# Patient Record
Sex: FEMALE | Race: BLACK OR AFRICAN AMERICAN | Employment: UNEMPLOYED | RURAL
[De-identification: names, ages, dates, MRNs, and addresses within clinical notes are randomized per-mention and may not be internally consistent; named-entity substitution may affect disease eponyms.]

---

## 2017-09-11 ENCOUNTER — OFFICE VISIT (OUTPATIENT)
Dept: INTERNAL MEDICINE CLINIC | Age: 42
End: 2017-09-11

## 2017-09-11 VITALS
BODY MASS INDEX: 45.08 KG/M2 | HEART RATE: 66 BPM | OXYGEN SATURATION: 100 % | TEMPERATURE: 96.1 F | WEIGHT: 245 LBS | SYSTOLIC BLOOD PRESSURE: 184 MMHG | RESPIRATION RATE: 20 BRPM | DIASTOLIC BLOOD PRESSURE: 96 MMHG | HEIGHT: 62 IN

## 2017-09-11 DIAGNOSIS — H57.89 IRRITATION OF RIGHT EYE: ICD-10-CM

## 2017-09-11 DIAGNOSIS — I10 ESSENTIAL HYPERTENSION: Primary | ICD-10-CM

## 2017-09-11 RX ORDER — AMLODIPINE BESYLATE 10 MG/1
10 TABLET ORAL DAILY
Qty: 90 TAB | Refills: 1 | Status: SHIPPED | OUTPATIENT
Start: 2017-09-11 | End: 2018-01-24 | Stop reason: SDUPTHER

## 2017-09-11 RX ORDER — HYDROCHLOROTHIAZIDE 12.5 MG/1
12.5 TABLET ORAL DAILY
Qty: 30 TAB | Refills: 5 | Status: SHIPPED | OUTPATIENT
Start: 2017-09-11 | End: 2017-12-15 | Stop reason: SDUPTHER

## 2017-09-11 NOTE — PROGRESS NOTES
HISTORY OF PRESENT ILLNESS  John Urbina is a 42 y.o. female. HPI  Chief Complaint   Patient presents with    Eye Problem     something on eyeball - light sensitive    Hypertension     States has been out of BP medication. States taking only metoprolol for BP. Denies chest pain or SOB    States last week eye became irritated. States feels like trash is in it. Denies trauma  States has been using visine that has been effective    Review of Systems   Constitutional: Negative. Negative for chills, fever and malaise/fatigue. HENT: Negative. Negative for congestion and sore throat. Eyes: Positive for discharge and redness. States feels like trash is in right eye. Respiratory: Negative. Negative for cough and shortness of breath. Cardiovascular: Negative. Negative for chest pain and leg swelling. Gastrointestinal: Negative. Negative for abdominal pain, blood in stool, constipation, diarrhea, nausea and vomiting. Genitourinary: Negative. Musculoskeletal: Negative for joint pain and myalgias. Skin: Negative. Neurological: Negative. Physical Exam   Constitutional: She is oriented to person, place, and time. She appears well-developed and well-nourished. No distress. HENT:   Head: Normocephalic and atraumatic. Eyes: Conjunctivae are normal.   Neck: Normal range of motion. Neck supple. Cardiovascular: Normal rate, regular rhythm, normal heart sounds and intact distal pulses. Exam reveals no gallop and no friction rub. No murmur heard. Pulmonary/Chest: Effort normal and breath sounds normal. No respiratory distress. She has no wheezes. She has no rales. Musculoskeletal: Normal range of motion. She exhibits tenderness. Low back pain   Neurological: She is alert and oriented to person, place, and time. Skin: Skin is warm and dry. She is not diaphoretic. Nursing note and vitals reviewed.     Plan of care and AVS reviewed with patient who verbalized understanding. ASSESSMENT and PLAN    ICD-10-CM ICD-9-CM    1. Essential hypertension I10 401.9 amLODIPine (NORVASC) 10 mg tablet      hydroCHLOROthiazide (HYDRODIURIL) 12.5 mg tablet   2. Irritation of right eye H57.8 379.99 REFERRAL TO OPHTHALMOLOGY      peg 400-propylene glycol, PF, (SYSTANE, PF,) 0.4-0.3 % dpet ophthalmic solution   Encouraged to make appointment tomorrow with eye doctor. Encouraged to use systane prn. Restarted BP medications.

## 2017-09-11 NOTE — PROGRESS NOTES
Out of refills on all meds - eye irrtated - red eyes - light sensitive - never had a mammogram   Aimee Aaron LPN  5/73/7304  7:28 PM

## 2017-09-11 NOTE — MR AVS SNAPSHOT
Visit Information Date & Time Provider Department Dept. Phone Encounter #  
 9/11/2017  2:45 PM Cynthia Werner, 1 [facility] [phone] Follow-up Instructions Return in about 1 week (around 9/18/2017) for HTN. Upcoming Health Maintenance Date Due Pneumococcal 19-64 Medium Risk (1 of 1 - PPSV23) 12/17/1993 DTaP/Tdap/Td series (1 - Tdap) 12/17/1995 BREAST CANCER SCRN MAMMOGRAM 12/17/2014 INFLUENZA AGE 9 TO ADULT 8/1/2017 PAP AKA CERVICAL CYTOLOGY 8/11/2018 Allergies as of 9/11/2017  Review Complete On: 9/11/2017 By: Cynthia Werner NP Severity Noted Reaction Type Reactions Ancef [Cefazolin]  01/30/2013    Hives Pcn [Penicillins]  01/30/2013    Swelling Current Immunizations  Never Reviewed No immunizations on file. Not reviewed this visit You Were Diagnosed With   
  
 Codes Comments Essential hypertension    -  Primary ICD-10-CM: I10 
ICD-9-CM: 401.9 Irritation of right eye     ICD-10-CM: H57.8 ICD-9-CM: 379.99 Vitals BP Pulse Temp Resp Height(growth percentile) Weight(growth percentile) (!) 184/96 (BP 1 Location: Right arm, BP Patient Position: At rest) 66 96.1 °F (35.6 °C) (Oral) 20 5' 2\" (1.575 m) 245 lb (111.1 kg) SpO2 BMI OB Status Smoking Status 100% 44.81 kg/m2 Hysterectomy Current Every Day Smoker Vitals History BMI and BSA Data Body Mass Index Body Surface Area 44.81 kg/m 2 2.2 m 2 Preferred Pharmacy Pharmacy Name Phone [facility] [phone] [address] [phone] Your Updated Medication List  
  
   
This list is accurate as of: 9/11/17  4:43 PM.  Always use your most recent med list. amLODIPine 10 mg tablet Commonly known as:  Herve Cordial Take 1 Tab by mouth daily. atorvastatin 40 mg tablet Commonly known as:  LIPITOR Take 1 Tab by mouth daily. DULoxetine 20 mg capsule Commonly known as:  CYMBALTA Take 1 Cap by mouth daily. hydroCHLOROthiazide 12.5 mg tablet Commonly known as:  HYDRODIURIL Take 1 Tab by mouth daily. Indications: hypertension  
  
 loxapine 10 mg capsule Commonly known as:  Sol Fallen Take 10 mg by mouth daily. menthol 4 % Gel Commonly known as:  BIOFREEZE (Dunning) 5 mL by Apply Externally route three (3) times daily. methylPREDNISolone 4 mg tablet Commonly known as:  Meally Sinks Take as directed  
  
 metoprolol tartrate 25 mg tablet Commonly known as:  LOPRESSOR Take 1 Tab by mouth two (2) times a day. Indications: hold for HR less than 55 or SBP less than 90  
  
 omeprazole 20 mg capsule Commonly known as:  PRILOSEC Take 1 Cap by mouth daily. SYSTANE (PF) 0.4-0.3 % Dpet ophthalmic solution Generic drug:  peg 400-propylene glycol (PF) Administer 2 Drops to both eyes as needed for Ocular Dryness. Prescriptions Sent to Pharmacy Refills  
 amLODIPine (NORVASC) 10 mg tablet 1 Sig: Take 1 Tab by mouth daily. Class: Normal  
 Pharmacy: [facility] [id number] [place] [address] Ph #: [phone] Route: Oral  
 hydroCHLOROthiazide (HYDRODIURIL) 12.5 mg tablet 5 Sig: Take 1 Tab by mouth daily. Indications: hypertension Class: Normal  
 Pharmacy: [facility] [id number] [address] Ph #: [phone] Route: Oral  
  
We Performed the Following REFERRAL TO OPHTHALMOLOGY [REF57 Custom] Comments:  
 Please evaluate and treat  patient for right eye irritation Follow-up Instructions Return in about 1 week (around 9/18/2017) for HTN. Referral Information Referral ID Referred By Referred To  
  
 [id number] Prabhu, [address] [facility], [facility]  Phone: [phone] Fax: [phone] Visits Status Start Date End Date 1 New Request 9/11/17 9/11/18 If your referral has a status of pending review or denied, additional information will be sent to support the outcome of this decision. Introducing [facility] [place] [facility]! [facility] introduces [facility] patient portal. Now you can access parts of your medical record, email your doctor's office, and request medication refills online. 1. In your internet browser, go to [email or web address]. [email or web address] 2. Click on the First Time User? Click Here link in the Sign In box. You will see the New Member Sign Up page. 3. Enter your [facility] Access Code exactly as it appears below. You will not need to use this code after youve completed the sign-up process. If you do not sign up before the expiration date, you must request a new code. · [facility] Access Code: [email or web address] Expires: 12/10/2017  3:09 PM 
 
4. Enter the last four digits of your Social Security Number (xxxx) and Date of Birth (mm/dd/yyyy) as indicated and click Submit. You will be taken to the next sign-up page. 5. Create a [facility] ID. This will be your [facility] login ID and cannot be changed, so think of one that is secure and easy to remember. 6. Create a [facility] password. You can change your password at any time. 7. Enter your Password Reset Question and Answer. This can be used at a later time if you forget your password. 8. Enter your e-mail address. You will receive e-mail notification when new information is available in [address] 9. Click Sign Up. You can now view and download portions of your medical record. 10. Click the Download Summary menu link to download a portable copy of your medical information. If you have questions, please visit the Frequently Asked Questions section of the [facility] website. Remember, [facility] is NOT to be used for urgent needs. For medical emergencies, dial 911. Now available from your iPhone and Android! Please provide this summary of care documentation to your next provider. Your primary care clinician is listed as Arville Numbers. If you have any questions after today's visit, please call [phone].

## 2017-12-15 ENCOUNTER — OFFICE VISIT (OUTPATIENT)
Dept: INTERNAL MEDICINE CLINIC | Age: 42
End: 2017-12-15

## 2017-12-15 VITALS
WEIGHT: 241.6 LBS | RESPIRATION RATE: 20 BRPM | DIASTOLIC BLOOD PRESSURE: 90 MMHG | BODY MASS INDEX: 44.46 KG/M2 | TEMPERATURE: 97.9 F | HEIGHT: 62 IN | SYSTOLIC BLOOD PRESSURE: 164 MMHG | HEART RATE: 72 BPM | OXYGEN SATURATION: 98 %

## 2017-12-15 DIAGNOSIS — E78.00 HIGH CHOLESTEROL: ICD-10-CM

## 2017-12-15 DIAGNOSIS — M54.42 CHRONIC MIDLINE LOW BACK PAIN WITH BILATERAL SCIATICA: Primary | ICD-10-CM

## 2017-12-15 DIAGNOSIS — M62.838 MUSCLE SPASM: ICD-10-CM

## 2017-12-15 DIAGNOSIS — G89.29 CHRONIC MIDLINE LOW BACK PAIN WITH BILATERAL SCIATICA: Primary | ICD-10-CM

## 2017-12-15 DIAGNOSIS — I10 ESSENTIAL HYPERTENSION: ICD-10-CM

## 2017-12-15 DIAGNOSIS — M54.41 CHRONIC MIDLINE LOW BACK PAIN WITH BILATERAL SCIATICA: Primary | ICD-10-CM

## 2017-12-15 DIAGNOSIS — R73.03 PREDIABETES: ICD-10-CM

## 2017-12-15 RX ORDER — CYCLOBENZAPRINE HCL 10 MG
10 TABLET ORAL
Qty: 45 TAB | Refills: 1 | Status: SHIPPED | OUTPATIENT
Start: 2017-12-15 | End: 2018-01-07 | Stop reason: SDUPTHER

## 2017-12-15 RX ORDER — IBUPROFEN 800 MG/1
800 TABLET ORAL
Qty: 60 TAB | Refills: 2 | Status: SHIPPED | OUTPATIENT
Start: 2017-12-15 | End: 2018-01-24 | Stop reason: SDUPTHER

## 2017-12-15 RX ORDER — KETOROLAC TROMETHAMINE 30 MG/ML
30 INJECTION, SOLUTION INTRAMUSCULAR; INTRAVENOUS ONCE
Qty: 1 VIAL | Refills: 0 | Status: SHIPPED | COMMUNITY
Start: 2017-12-15 | End: 2017-12-15

## 2017-12-15 RX ORDER — HYDROCHLOROTHIAZIDE 25 MG/1
25 TABLET ORAL DAILY
Qty: 30 TAB | Refills: 5 | COMMUNITY
Start: 2017-12-15 | End: 2018-01-24 | Stop reason: SDUPTHER

## 2017-12-15 NOTE — PROGRESS NOTES
Chief Complaint   Patient presents with    Back Pain     PAIN WENT DOWN TO BILATERAL PINKY TOES TO POINT OF FEET FEELING NUMB. SHE STATES THAT THE MUSCLES IN HER BACK IS GETTING TIGHTER AND NOT GETTING LOOSE. 1. Have you been to the ER, urgent care clinic since your last visit? Hospitalized since your last visit? No    2. Have you seen or consulted any other health care providers outside of the [address] [place] [address] since your last visit? Include any pap smears or colon screening. No     There are no preventive care reminders to display for this patient.

## 2017-12-15 NOTE — PROGRESS NOTES
HISTORY OF PRESENT ILLNESS  Daniel Walker is a 42 y.o. female. HPI  Chief Complaint   Patient presents with    Back Pain     PAIN WENT DOWN TO BILATERAL PINKY TOES TO POINT OF FEET FEELING NUMB. SHE STATES THAT THE MUSCLES IN HER BACK IS GETTING TIGHTER AND NOT GETTING LOOSE. Patient in with C/O chronic back pain. Patient has been seen before for this condition and has not followed thru with appointments with pain management and orthro. Discussed with patient will not give narcotics for pain related to her admission of previous misuse of narcotic medication given to her. Patient C/O low back pain rating 8/10. States is having muscle spasms also. States pain is keeping her from taking trips. States walking long periods of time causes leg pain. Review of Systems   Constitutional: Negative for chills, fever and malaise/fatigue. HENT: Negative for congestion, ear pain and sinus pain. Eyes: Negative. Respiratory: Negative. Negative for cough and shortness of breath. Cardiovascular: Negative. Negative for chest pain and leg swelling. Gastrointestinal: Negative. Negative for abdominal pain, constipation, diarrhea and vomiting. Genitourinary: Negative. Skin: Negative. Physical Exam   Constitutional: She is oriented to person, place, and time. She appears well-developed and well-nourished. No distress. HENT:   Head: Normocephalic and atraumatic. Eyes: Conjunctivae are normal.   Neck: Normal range of motion. Neck supple. Cardiovascular: Normal rate, regular rhythm, normal heart sounds and intact distal pulses. Exam reveals no gallop and no friction rub. No murmur heard. Pulmonary/Chest: Effort normal and breath sounds normal.   Abdominal: Soft. Bowel sounds are normal. She exhibits no distension. There is no tenderness. Musculoskeletal: Normal range of motion. She exhibits tenderness. She exhibits no edema or deformity.    Neurological: She is alert and oriented to person, place, and time. Skin: Skin is warm and dry. No rash noted. She is not diaphoretic. No erythema. Nursing note and vitals reviewed. Plan of care and AVS reviewed with patient who verbalized understanding. ASSESSMENT and PLAN    ICD-10-CM ICD-9-CM    1. Chronic midline low back pain with bilateral sciatica M54.41 724.2 ibuprofen (MOTRIN) 800 mg tablet    M54.42 724.3 cyclobenzaprine (FLEXERIL) 10 mg tablet    G89.29 338.29 ketorolac tromethamine (TORADOL) 60 mg/2 mL soln      KETOROLAC TROMETHAMINE INJ      DE THER/PROPH/DIAG INJECTION, SUBCUT/IM   2. Essential hypertension I10 401.9 hydroCHLOROthiazide (HYDRODIURIL) 25 mg tablet      METABOLIC PANEL, COMPREHENSIVE      CANCELED: METABOLIC PANEL, COMPREHENSIVE   3. Muscle spasm M62.838 728.85 cyclobenzaprine (FLEXERIL) 10 mg tablet   4. Prediabetes R73.03 790.29 HEMOGLOBIN A1C WITH EAG      CANCELED: HEMOGLOBIN A1C WITH EAG   5. High cholesterol E78.00 272.0 LIPID PANEL      CANCELED: LIPID PANEL   Patient to call Ortho to schedule appointment. Discussed with patient use of motrin and flexeril  Advised flexeril to cause sedation and not to drive vehicle while taking.   Patient to come in for labs 1 week before appointment/physical exam.

## 2017-12-15 NOTE — PATIENT INSTRUCTIONS
Body Mass Index: Care Instructions  Your Care Instructions    Body mass index (BMI) can help you see if your weight is raising your risk for health problems. It uses a formula to compare how much you weigh with how tall you are. · A BMI lower than 18.5 is considered underweight. · A BMI between 18.5 and 24.9 is considered healthy. · A BMI between 25 and 29.9 is considered overweight. A BMI of 30 or higher is considered obese. If your BMI is in the normal range, it means that you have a lower risk for weight-related health problems. If your BMI is in the overweight or obese range, you may be at increased risk for weight-related health problems, such as high blood pressure, heart disease, stroke, arthritis or joint pain, and diabetes. If your BMI is in the underweight range, you may be at increased risk for health problems such as fatigue, lower protection (immunity) against illness, muscle loss, bone loss, hair loss, and hormone problems. BMI is just one measure of your risk for weight-related health problems. You may be at higher risk for health problems if you are not active, you eat an unhealthy diet, or you drink too much alcohol or use tobacco products. Follow-up care is a key part of your treatment and safety. Be sure to make and go to all appointments, and call your doctor if you are having problems. It's also a good idea to know your test results and keep a list of the medicines you take. How can you care for yourself at home? · Practice healthy eating habits. This includes eating plenty of fruits, vegetables, whole grains, lean protein, and low-fat dairy. · If your doctor recommends it, get more exercise. Walking is a good choice. Bit by bit, increase the amount you walk every day. Try for at least 30 minutes on most days of the week. · Do not smoke. Smoking can increase your risk for health problems. If you need help quitting, talk to your doctor about stop-smoking programs and medicines. These can increase your chances of quitting for good. · Limit alcohol to 2 drinks a day for men and 1 drink a day for women. Too much alcohol can cause health problems. If you have a BMI higher than 25  · Your doctor may do other tests to check your risk for weight-related health problems. This may include measuring the distance around your waist. A waist measurement of more than 40 inches in men or 35 inches in women can increase the risk of weight-related health problems. · Talk with your doctor about steps you can take to stay healthy or improve your health. You may need to make lifestyle changes to lose weight and stay healthy, such as changing your diet and getting regular exercise. If you have a BMI lower than 18.5  · Your doctor may do other tests to check your risk for health problems. · Talk with your doctor about steps you can take to stay healthy or improve your health. You may need to make lifestyle changes to gain or maintain weight and stay healthy, such as getting more healthy foods in your diet and doing exercises to build muscle. Where can you learn more? Go to http://polly-hasmukh.info/. Enter S176 in the search box to learn more about \"Body Mass Index: Care Instructions. \"  Current as of: October 13, 2016  Content Version: 11.4  © 3268-3157 [employer]. Care instructions adapted under license by [employer] (which disclaims liability or warranty for this information). If you have questions about a medical condition or this instruction, always ask your healthcare professional. [address] any warranty or liability for your use of this information.

## 2017-12-15 NOTE — MR AVS SNAPSHOT
Visit Information Date & Time Provider Department Dept. Phone Encounter #  
 12/15/2017  2:00 PM Gauri Ornelas, 1 [facility] [phone] Follow-up Instructions Return in about 1 month (around 1/15/2018) for physical exam. Upcoming Health Maintenance Date Due Pneumococcal 19-64 Medium Risk (1 of 1 - PPSV23) 12/30/2017* BREAST CANCER SCRN MAMMOGRAM 12/30/2017* PAP AKA CERVICAL CYTOLOGY 8/11/2018 DTaP/Tdap/Td series (2 - Td) 9/11/2027 *Topic was postponed. The date shown is not the original due date. Allergies as of 12/15/2017  Review Complete On: 12/15/2017 By: Gauri Ornelas NP Severity Noted Reaction Type Reactions Ancef [Cefazolin]  01/30/2013    Hives Pcn [Penicillins]  01/30/2013    Swelling Current Immunizations  Never Reviewed No immunizations on file. Not reviewed this visit You Were Diagnosed With   
  
 Codes Comments Chronic midline low back pain with bilateral sciatica    -  Primary ICD-10-CM: M54.41, M54.42, G89.29 ICD-9-CM: 724.2, 724.3, 338.29 Essential hypertension     ICD-10-CM: I10 
ICD-9-CM: 401.9 Muscle spasm     ICD-10-CM: L58.339 ICD-9-CM: 728.85 Prediabetes     ICD-10-CM: R73.03 
ICD-9-CM: 790.29 High cholesterol     ICD-10-CM: E78.00 ICD-9-CM: 272.0 Vitals BP Pulse Temp Resp Height(growth percentile) (!) 164/94 (BP 1 Location: Left arm, BP Patient Position: Sitting) 72 97.9 °F (36.6 °C) (Temporal) 20 5' 2\" (1.575 m) Weight(growth percentile) SpO2 BMI OB Status Smoking Status 241 lb 9.6 oz (109.6 kg) 98% 44.19 kg/m2 Hysterectomy Current Every Day Smoker Vitals History BMI and BSA Data Body Mass Index Body Surface Area  
 44.19 kg/m 2 2.19 m 2 Preferred Pharmacy Pharmacy Name Phone [phone] [facility] 20 [phone] Your Updated Medication List  
  
   
 This list is accurate as of: 12/15/17  2:59 PM.  Always use your most recent med list. amLODIPine 10 mg tablet Commonly known as:  Stuart Alstrom Take 1 Tab by mouth daily. atorvastatin 40 mg tablet Commonly known as:  LIPITOR Take 1 Tab by mouth daily. cyclobenzaprine 10 mg tablet Commonly known as:  FLEXERIL Take 1 Tab by mouth three (3) times daily as needed for Muscle Spasm(s). DULoxetine 20 mg capsule Commonly known as:  CYMBALTA Take 1 Cap by mouth daily. hydroCHLOROthiazide 25 mg tablet Commonly known as:  HYDRODIURIL Take 1 Tab by mouth daily. Indications: hypertension  
  
 ibuprofen 800 mg tablet Commonly known as:  MOTRIN Take 1 Tab by mouth every eight (8) hours as needed for Pain.  
  
 ketorolac tromethamine 60 mg/2 mL Soln Commonly known as:  TORADOL  
1 mL by IntraMUSCular route once for 1 dose. metoprolol tartrate 25 mg tablet Commonly known as:  LOPRESSOR Take 1 Tab by mouth two (2) times a day. Indications: hold for HR less than 55 or SBP less than 90 Prescriptions Sent to Pharmacy Refills  
 ibuprofen (MOTRIN) 800 mg tablet 2 Sig: Take 1 Tab by mouth every eight (8) hours as needed for Pain. Class: Normal  
 Pharmacy: [facility] [id number] [place] [address] Ph #: [phone] Route: Oral  
 cyclobenzaprine (FLEXERIL) 10 mg tablet 1 Sig: Take 1 Tab by mouth three (3) times daily as needed for Muscle Spasm(s). Class: Normal  
 Pharmacy: [facility] [id number] [place] [address] Ph #: [phone] Route: Oral  
  
We Performed the Following KETOROLAC TROMETHAMINE INJ [ [facility]] [place] THER/PROPH/DIAG INJECTION, SUBCUT/IM [id number] CPT(R)] Follow-up Instructions Return in about 1 month (around 1/15/2018) for physical exam. To-Do List   
 12/15/2017 Lab:  HEMOGLOBIN A1C WITH EAG   
  
 12/15/2017   Lab:  LIPID PANEL   
  
 12/15/2017 Lab:  METABOLIC PANEL, COMPREHENSIVE Patient Instructions Body Mass Index: Care Instructions Your Care Instructions Body mass index (BMI) can help you see if your weight is raising your risk for health problems. It uses a formula to compare how much you weigh with how tall you are. · A BMI lower than 18.5 is considered underweight. · A BMI between 18.5 and 24.9 is considered healthy. · A BMI between 25 and 29.9 is considered overweight. A BMI of 30 or higher is considered obese. If your BMI is in the normal range, it means that you have a lower risk for weight-related health problems. If your BMI is in the overweight or obese range, you may be at increased risk for weight-related health problems, such as high blood pressure, heart disease, stroke, arthritis or joint pain, and diabetes. If your BMI is in the underweight range, you may be at increased risk for health problems such as fatigue, lower protection (immunity) against illness, muscle loss, bone loss, hair loss, and hormone problems. BMI is just one measure of your risk for weight-related health problems. You may be at higher risk for health problems if you are not active, you eat an unhealthy diet, or you drink too much alcohol or use tobacco products. Follow-up care is a key part of your treatment and safety. Be sure to make and go to all appointments, and call your doctor if you are having problems. It's also a good idea to know your test results and keep a list of the medicines you take. How can you care for yourself at home? · Practice healthy eating habits. This includes eating plenty of fruits, vegetables, whole grains, lean protein, and low-fat dairy. · If your doctor recommends it, get more exercise. Walking is a good choice. Bit by bit, increase the amount you walk every day. Try for at least 30 minutes on most days of the week. · Do not smoke. Smoking can increase your risk for health problems. If you need help quitting, talk to your doctor about stop-smoking programs and medicines. These can increase your chances of quitting for good. · Limit alcohol to 2 drinks a day for men and 1 drink a day for women. Too much alcohol can cause health problems. If you have a BMI higher than 25 · Your doctor may do other tests to check your risk for weight-related health problems. This may include measuring the distance around your waist. A waist measurement of more than 40 inches in men or 35 inches in women can increase the risk of weight-related health problems. · Talk with your doctor about steps you can take to stay healthy or improve your health. You may need to make lifestyle changes to lose weight and stay healthy, such as changing your diet and getting regular exercise. If you have a BMI lower than 18.5 · Your doctor may do other tests to check your risk for health problems. · Talk with your doctor about steps you can take to stay healthy or improve your health. You may need to make lifestyle changes to gain or maintain weight and stay healthy, such as getting more healthy foods in your diet and doing exercises to build muscle. Where can you learn more? Go to http://polly-hasmukh.info/. Enter S176 in the search box to learn more about \"Body Mass Index: Care Instructions. \" Current as of: October 13, 2016 Content Version: 11.4 © 4429-6333 Healthwise, Incorporated. Care instructions adapted under license by [employer] (which disclaims liability or warranty for this information). If you have questions about a medical condition or this instruction, always ask your healthcare professional. [place] [address] any warranty or liability for your use of this information. Introducing [facility] & HEALTH SERVICES!    
 [facility] introduces MyMedMatch patient portal. Now you can access parts of your medical record, email your doctor's office, and request medication refills online. 1. In your internet browser, go to [email or web address] 2. Click on the First Time User? Click Here link in the Sign In box. You will see the New Member Sign Up page. 3. Enter your Teach 'n Go Access Code exactly as it appears below. You will not need to use this code after youve completed the sign-up process. If you do not sign up before the expiration date, you must request a new code. · Teach 'n Go Access Code: [email or web address] Expires: 3/15/2018  1:49 PM 
 
4. Enter the last four digits of your Social Security Number (xxxx) and Date of Birth (mm/dd/yyyy) as indicated and click Submit. You will be taken to the next sign-up page. 5. Create a Teach 'n Go ID. This will be your Teach 'n Go login ID and cannot be changed, so think of one that is secure and easy to remember. 6. Create a Teach 'n Go password. You can change your password at any time. 7. Enter your Password Reset Question and Answer. This can be used at a later time if you forget your password. 8. Enter your e-mail address. You will receive e-mail notification when new information is available in [address] 9. Click Sign Up. You can now view and download portions of your medical record. 10. Click the Download Summary menu link to download a portable copy of your medical information. If you have questions, please visit the Frequently Asked Questions section of the Teach 'n Go website. Remember, Teach 'n Go is NOT to be used for urgent needs. For medical emergencies, dial 911. Now available from your iPhone and Android! Please provide this summary of care documentation to your next provider. Your primary care clinician is listed as Kiki Perez. If you have any questions after today's visit, please call [phone].

## 2017-12-15 NOTE — PROGRESS NOTES
Discussed the patient's BMI with her. The BMI follow up plan is as follows:     dietary management education, guidance, and counseling  encourage exercise  monitor weight  prescribed dietary intake    An After Visit Summary was printed and given to the patient.

## 2018-01-07 DIAGNOSIS — G89.29 CHRONIC MIDLINE LOW BACK PAIN WITH BILATERAL SCIATICA: ICD-10-CM

## 2018-01-07 DIAGNOSIS — M62.838 MUSCLE SPASM: ICD-10-CM

## 2018-01-07 DIAGNOSIS — M54.42 CHRONIC MIDLINE LOW BACK PAIN WITH BILATERAL SCIATICA: ICD-10-CM

## 2018-01-07 DIAGNOSIS — M54.41 CHRONIC MIDLINE LOW BACK PAIN WITH BILATERAL SCIATICA: ICD-10-CM

## 2018-01-08 RX ORDER — CYCLOBENZAPRINE HCL 10 MG
TABLET ORAL
Qty: 45 TAB | Refills: 0 | Status: SHIPPED | OUTPATIENT
Start: 2018-01-08 | End: 2021-11-10 | Stop reason: ALTCHOICE

## 2018-01-24 ENCOUNTER — OFFICE VISIT (OUTPATIENT)
Dept: INTERNAL MEDICINE CLINIC | Age: 43
End: 2018-01-24

## 2018-01-24 VITALS
OXYGEN SATURATION: 100 % | SYSTOLIC BLOOD PRESSURE: 140 MMHG | RESPIRATION RATE: 16 BRPM | WEIGHT: 235 LBS | DIASTOLIC BLOOD PRESSURE: 79 MMHG | HEART RATE: 65 BPM | BODY MASS INDEX: 41.64 KG/M2 | TEMPERATURE: 97.3 F | HEIGHT: 63 IN

## 2018-01-24 DIAGNOSIS — G89.29 CHRONIC MIDLINE LOW BACK PAIN WITH BILATERAL SCIATICA: ICD-10-CM
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
## 2018-01-24 DIAGNOSIS — R25.2 CRAMPS OF LEFT LOWER EXTREMITY: ICD-10-CM

## 2018-01-24 DIAGNOSIS — M54.42 CHRONIC MIDLINE LOW BACK PAIN WITH BILATERAL SCIATICA: ICD-10-CM

## 2018-01-24 DIAGNOSIS — M54.41 CHRONIC MIDLINE LOW BACK PAIN WITH BILATERAL SCIATICA: ICD-10-CM

## 2018-01-24 DIAGNOSIS — I10 ESSENTIAL HYPERTENSION: ICD-10-CM

## 2018-01-24 DIAGNOSIS — G44.319 ACUTE POST-TRAUMATIC HEADACHE, NOT INTRACTABLE: Primary | ICD-10-CM

## 2018-01-24 RX ORDER — IBUPROFEN 800 MG/1
800 TABLET ORAL
Qty: 60 TAB | Refills: 2 | Status: SHIPPED | OUTPATIENT
Start: 2018-01-24 | End: 2021-11-10 | Stop reason: ALTCHOICE

## 2018-01-24 RX ORDER — POTASSIUM CHLORIDE 750 MG/1
10 TABLET, FILM COATED, EXTENDED RELEASE ORAL DAILY
Qty: 30 TAB | Refills: 5 | Status: SHIPPED | OUTPATIENT
Start: 2018-01-24 | End: 2021-11-10 | Stop reason: ALTCHOICE

## 2018-01-24 RX ORDER — HYDROCHLOROTHIAZIDE 25 MG/1
25 TABLET ORAL DAILY
Qty: 90 TAB | Refills: 1 | Status: SHIPPED | OUTPATIENT
Start: 2018-01-24 | End: 2021-11-10 | Stop reason: ALTCHOICE

## 2018-01-24 RX ORDER — DULOXETIN HYDROCHLORIDE 20 MG/1
20 CAPSULE, DELAYED RELEASE ORAL DAILY
Qty: 90 CAP | Refills: 1 | Status: SHIPPED | OUTPATIENT
Start: 2018-01-24 | End: 2022-09-01

## 2018-01-24 RX ORDER — ATORVASTATIN CALCIUM 40 MG/1
40 TABLET, FILM COATED ORAL DAILY
Qty: 90 TAB | Refills: 1 | Status: SHIPPED | OUTPATIENT
Start: 2018-01-24 | End: 2021-11-10 | Stop reason: SDUPTHER

## 2018-01-24 RX ORDER — AMLODIPINE BESYLATE 10 MG/1
10 TABLET ORAL DAILY
Qty: 90 TAB | Refills: 1 | Status: SHIPPED | OUTPATIENT
Start: 2018-01-24 | End: 2021-11-10 | Stop reason: SDUPTHER

## 2018-01-24 RX ORDER — METOPROLOL TARTRATE 25 MG/1
25 TABLET, FILM COATED ORAL 2 TIMES DAILY
Qty: 180 TAB | Refills: 1 | Status: SHIPPED | OUTPATIENT
Start: 2018-01-24 | End: 2021-11-10 | Stop reason: SDUPTHER
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
## 2018-01-24 NOTE — PATIENT INSTRUCTIONS
Cramps are painful. There cause is unknown. Sometimes potassium supplements can help. Other things can be tried: quinine, which is found in tonic water can be helpful. Pickle juice and bananas can also be tried.

## 2018-05-29 NOTE — PROGRESS NOTES
SUBJECTIVE:   Manda Madera is a 12 year old female, here for a routine health maintenance visit,   accompanied by her mother.    Patient was roomed by: Brenda Ambrosio MA  Answers for HPI/ROS submitted by the patient on 5/29/2018   Well child visit  Forms to complete?: No  Child lives with: mother, father  Languages spoken in the home: English  Recent family changes/ special stressors?: none noted  TB Family Exposure: No  TB History: No  TB Birth Country: No  TB Travel Exposure: No  Child always wears seat belt: Yes  Helmet worn for bicycle/roller blades/skateboard: No  Parents monitor use of computers and internet?: No  Firearms in the home?: Yes  Does child have a dental provider?: Yes  Water source: well water  a parent has had a cavity in past 3 years: Yes  child has or had a cavity: Yes  child eats candy or sweets more than 3 times daily: No  child drinks juice or pop more than 3 times daily: No  child has a serious medical or physical disability: No  TV in child's bedroom: Yes  Media used by child: video/dvd/tv, social media  Daily use of media (hours): 4  school name: [employer]  grade level in school: 7th  school performance: at grade level  Grades: A B  problems in reading: No  problems in mathematics: No  problems in writing: No  learning disabilities: No  Days of school missed: 1  Concerns: No  Minimum of 60 min/day of physical activity, including time in and out of school: Yes  Activities: other  Organized and team sports: dance  Daily fruit and vegetables: Yes  Servings of juice, non-diet soda, punch or sports drinks per day: 2  Sleep concerns: no concerns- sleeps well through night  bed time: 10:00 PM  average sleep duration (hrs): 7  Sports physical needed?: No  Academic problems:: 1  Are trigger locks present?: Yes  Is ammunition stored separately from firearms?: Yes      MENSTRUAL HISTORY  Normal      ROS  GENERAL: See health history, nutrition and daily activities   SKIN: No   Chief Complaint   Patient presents with    Trauma     sore R/shoulder and shoulder    knot behind R/ear     I have reviewed the patient's medical history in detail and updated the computerized patient record. Health Maintenance reviewed. 1. Have you been to the ER, urgent care clinic since your last visit? Hospitalized since your last visit? yes    2. Have you seen or consulted any other health care providers outside of the [address] [place] [address] [facility] since your last visit? Include any pap smears or colon screening. Yes  [facility]    Encouraged pt to discuss pt's wishes with spouse/partner/family and bring them in the next appt to follow thru with the Advanced Directive    Fall Risk Assessment, last 12 mths 1/24/2018   Able to walk? Yes   Fall in past 12 months? No       PHQ over the last two weeks 1/24/2018   Little interest or pleasure in doing things Several days   Feeling down, depressed or hopeless Several days   Total Score PHQ 2 2       Abuse Screening Questionnaire 1/24/2018   Do you ever feel afraid of your partner? N   Are you in a relationship with someone who physically or mentally threatens you? N   Is it safe for you to go home?  Y       ADL Assessment 1/24/2018   Feeding yourself No Help Needed   Getting from bed to chair No Help Needed   Getting dressed No Help Needed   Bathing or showering No Help Needed   Walk across the room (includes cane/walker) No Help Needed   Using the telphone No Help Needed   Taking your medications No Help Needed   Preparing meals No Help Needed   Managing money (expenses/bills) No Help Needed   Moderately strenuous housework (laundry) No Help Needed   Shopping for personal items (toiletries/medicines) No Help Needed   Shopping for groceries No Help Needed   Driving No Help Needed   Climbing a flight of stairs No Help Needed   Getting to places beyond walking distances No Help Needed "rash, hives or significant lesions  HEENT: Hearing/vision: see above.  No eye, nasal, ear symptoms.  RESP: No cough or other concerns  CV: No concerns  GI: See nutrition and elimination.  No concerns.  : See elimination. No concerns  NEURO: No headaches or concerns.    OBJECTIVE:   EXAM  /74 (BP Location: Right arm, Patient Position: Sitting, Cuff Size: Adult Regular)  Pulse 86  Temp 97.6  F (36.4  C) (Temporal)  Resp 16  Ht 5' 1.4\" (1.56 m)  Wt 98 lb (44.5 kg)  LMP 05/24/2018 (Approximate)  SpO2 99%  Breastfeeding? No  BMI 18.28 kg/m2  40 %ile based on CDC 2-20 Years stature-for-age data using vitals from 5/29/2018.  42 %ile based on CDC 2-20 Years weight-for-age data using vitals from 5/29/2018.  43 %ile based on CDC 2-20 Years BMI-for-age data using vitals from 5/29/2018.  Blood pressure percentiles are 24.9 % systolic and 85.1 % diastolic based on the August 2017 AAP Clinical Practice Guideline.  GENERAL: Active, alert, in no acute distress.  SKIN: Clear. No significant rash, abnormal pigmentation or lesions  HEAD: Normocephalic  EYES: Pupils equal, round, reactive, Extraocular muscles intact. Normal conjunctivae.  EARS: Normal canals. Tympanic membranes are normal; gray and translucent.  NOSE: Normal without discharge.  MOUTH/THROAT: Clear. No oral lesions. Teeth without obvious abnormalities.  NECK: Supple, no masses.  No thyromegaly.  LYMPH NODES: No adenopathy  LUNGS: Clear. No rales, rhonchi, wheezing or retractions  HEART: Regular rhythm. Normal S1/S2. No murmurs. Normal pulses.  ABDOMEN: Soft, non-tender, not distended, no masses or hepatosplenomegaly. Bowel sounds normal.   NEUROLOGIC: No focal findings. Cranial nerves grossly intact: DTR's normal. Normal gait, strength and tone  BACK: Spine is straight, no scoliosis.  EXTREMITIES: Full range of motion, no deformities  : Exam deferred.    ASSESSMENT/PLAN:   (Z00.129) Encounter for routine child health examination w/o abnormal findings  " (primary encounter diagnosis)  Comment: Healthy, well-developed adolescent. Doing well in school. No concerns  Plan: continue yearly well exams    (B07.8) Common wart  Comment: a cluster of plantar warts on her left thumb near the nail that has been present for about 4 months. She picks at it and it is sometimes painful  Plan: the wart was treated with the Cryogun with 3 freeze thaw cycles and then coated with canthacur. The patient is aware of the potential for blistering and discomfort over the area that the wart(s) were treated, and will use tylenol or ibuprofen as needed. The patient will wash the canthacur off after 4-6 hrs with soap and water or sooner if blistering or pain is intolerable.       Anticipatory Guidance  The following topics were discussed:  SOCIAL/ FAMILY:    Peer pressure    Increased responsibility    Parent/ teen communication    Limits/consequences    School/ homework  NUTRITION:    Healthy food choices    Family meals  HEALTH/ SAFETY:    Adequate sleep/ exercise    Drugs, ETOH, smoking  SEXUALITY:    Menstruation    Dating/ relationships    Encourage abstinence    Contraception    Preventive Care Plan  Immunizations    Reviewed, up to date  Referrals/Ongoing Specialty care: No   See other orders in EpicCare.  Cleared for sports:  Not addressed  BMI at 43 %ile based on CDC 2-20 Years BMI-for-age data using vitals from 5/29/2018.  No weight concerns.      FOLLOW-UP:     in 1 year for a Preventive Care visit    Resources  HPV and Cancer Prevention:  What Parents Should Know  What Kids Should Know About HPV and Cancer  Goal Tracker: Be More Active  Goal Tracker: Less Screen Time  Goal Tracker: Drink More Water  Goal Tracker: Eat More Fruits and Veggies    This document serves as a record of the services and decisions personally performed by Kasi Boone MD. It was created on his behalf by Jayy Yoder, a trained medical student. The creation of this record is based on the provider's  observations and the statements of the patient.      Jayy Paresh, MS4  May 29, 2018    I agree with the PFSH and ROS as completed by the MS.  The remainder of the encounter was performed by me and scribed by the MS.  The scribed note accurately reflects my personal services and the decisions made by me.     Electronically signed by:  Kasi Boone M.D.  5/29/2018

## 2021-04-23 ENCOUNTER — VIRTUAL VISIT (OUTPATIENT)
Dept: INTERNAL MEDICINE CLINIC | Age: 46
End: 2021-04-23
Payer: MEDICAID

## 2021-04-23 DIAGNOSIS — B37.0 THRUSH: ICD-10-CM

## 2021-04-23 DIAGNOSIS — T78.40XA ALLERGY, INITIAL ENCOUNTER: ICD-10-CM

## 2021-04-23 DIAGNOSIS — J03.90 TONSILLITIS: Primary | ICD-10-CM

## 2021-04-23 PROCEDURE — 99214 OFFICE O/P EST MOD 30 MIN: CPT | Performed by: NURSE PRACTITIONER

## 2021-04-23 RX ORDER — BUPRENORPHINE HYDROCHLORIDE, NALOXONE HYDROCHLORIDE 2; .5 MG/1; MG/1
FILM, SOLUBLE BUCCAL; SUBLINGUAL
COMMUNITY
Start: 2021-04-14 | End: 2022-09-17

## 2021-04-23 RX ORDER — CLINDAMYCIN HYDROCHLORIDE 300 MG/1
300 CAPSULE ORAL 3 TIMES DAILY
Qty: 21 CAP | Refills: 0 | Status: SHIPPED | OUTPATIENT
Start: 2021-04-23 | End: 2021-04-30

## 2021-04-23 RX ORDER — CETIRIZINE HCL 10 MG
10 TABLET ORAL DAILY
Qty: 30 TAB | Refills: 1 | Status: SHIPPED | OUTPATIENT
Start: 2021-04-23 | End: 2022-10-04 | Stop reason: SDUPTHER

## 2021-04-23 RX ORDER — NYSTATIN 100000 [USP'U]/ML
1 SUSPENSION ORAL 4 TIMES DAILY
Qty: 140 ML | Refills: 0 | Status: SHIPPED | OUTPATIENT
Start: 2021-04-23 | End: 2021-04-30

## 2021-04-23 NOTE — PROGRESS NOTES
Chief Complaint   Patient presents with    Cyst     swelling on right side of neck - 5-6 days ago - sore throat - no fever - sore as the day goes on - feels pressure in neck when she bends over      Fall Risk Assessment, last 12 mths 1/24/2018   Able to walk? Yes   Fall in past 12 months? No       3 most recent PHQ Screens 4/23/2021   Little interest or pleasure in doing things Not at all   Feeling down, depressed, irritable, or hopeless Not at all   Total Score PHQ 2 0       Abuse Screening Questionnaire 4/23/2021   Do you ever feel afraid of your partner? N   Are you in a relationship with someone who physically or mentally threatens you? N   Is it safe for you to go home?  Y       ADL Assessment 4/23/2021   Feeding yourself No Help Needed   Getting from bed to chair No Help Needed   Getting dressed No Help Needed   Bathing or showering No Help Needed   Walk across the room (includes cane/walker) No Help Needed   Using the telphone No Help Needed   Taking your medications No Help Needed   Preparing meals No Help Needed   Managing money (expenses/bills) No Help Needed   Moderately strenuous housework (laundry) No Help Needed   Shopping for personal items (toiletries/medicines) No Help Needed   Shopping for groceries No Help Needed   Driving No Help Needed   Climbing a flight of stairs -   Getting to places beyond walking distances No Help Needed

## 2021-04-23 NOTE — PROGRESS NOTES
Reginald Encarnacion is a 54 y.o. female who was seen by synchronous (real-time) audio-video technology on 4/23/2021 for:  Chief Complaint   Patient presents with    Cyst     swelling on right side of neck - 5-6 days ago - sore throat - no fever - sore as the day goes on - feels pressure in neck when she bends over      Assessment & Plan:   The complexity of medical decision making for this visit is high     I spent at least 25 minutes on this visit with this established patient. Subjective:   Complains of right sided cyst pain x 5 days that hurts when she bends over. Associated symptoms: pharyngitis and left ear otalgia. Denies fever or chills. Complained of \" white spots  on tongue. \"  Prior to Admission medications    Medication Sig Start Date End Date Taking? Authorizing Provider   Suboxone 2-0.5 mg film sublingual film DISSOLVE 1 FILM BY MOUTH TWICE DAILY 4/14/21  Yes Provider, Historical   atorvastatin (LIPITOR) 40 mg tablet Take 1 Tab by mouth daily. 1/24/18  Yes Angus Linton MD   metoprolol tartrate (LOPRESSOR) 25 mg tablet Take 1 Tab by mouth two (2) times a day. Indications: hold for HR less than 55 or SBP less than 90 1/24/18  Yes Angus Linton MD   potassium chloride SR (KLOR-CON 10) 10 mEq tablet Take 1 Tab by mouth daily. 1/24/18   Angus Linton MD   hydroCHLOROthiazide (HYDRODIURIL) 25 mg tablet Take 1 Tab by mouth daily. Indications: hypertension 1/24/18   Angus Linton MD   ibuprofen (MOTRIN) 800 mg tablet Take 1 Tab by mouth every eight (8) hours as needed for Pain. 1/24/18   Angus Linton MD   amLODIPine (NORVASC) 10 mg tablet Take 1 Tab by mouth daily. 1/24/18   Angus Linton MD   DULoxetine (CYMBALTA) 20 mg capsule Take 1 Cap by mouth daily.  1/24/18   Angus Linton MD   cyclobenzaprine (FLEXERIL) 10 mg tablet take 1 tablet by mouth three times a day if needed for MUSCLE SPASMS 1/8/18   Mario Hunter, TYSON     Allergies   Allergen Reactions    Ancef [Cefazolin] Hives    Pcn [Penicillins] Swelling     Past Medical History:   Diagnosis Date    Chronic back pain     x 1 year/heavy lifting    Endocrine disease     Hypothyroid    Headache(784.0)     Hypercholesterolemia     Hypertension     2 years ago    Prediabetes      Past Surgical History:   Procedure Laterality Date    ABDOMEN SURGERY PROC UNLISTED      hernia/4 years ago    HX BREAST REDUCTION  2004    HX CHOLECYSTECTOMY      AGe 25    HX GYN      hysterectomy/2008    HX GYN  2005 and 2006    Tubal pregnacy     Family History   Problem Relation Age of Onset    Kidney Disease Mother     Cancer Father         lung    Hypertension Maternal Aunt     Kidney Disease Maternal Uncle     Hypertension Maternal Grandmother     High Cholesterol Maternal Grandmother     Arthritis-rheumatoid Maternal Grandmother     Thyroid Disease Maternal Grandmother     Asthma Sister      Social History     Tobacco Use    Smoking status: Current Every Day Smoker     Packs/day: 0.25     Types: Cigarettes    Smokeless tobacco: Never Used   Substance Use Topics    Alcohol use: No     Review of Systems   Constitutional: Negative for chills and fever. HENT: Positive for ear pain (left ear) and sore throat. Negative for congestion. Eyes:        Eyes itch   Respiratory: Negative for cough, shortness of breath and wheezing. Cardiovascular: Negative for chest pain. Neurological: Negative for dizziness and headaches. Endo/Heme/Allergies: Positive for environmental allergies. Objective:   No flowsheet data found.      [INSTRUCTIONS:  \"[x]\" Indicates a positive item  \"[]\" Indicates a negative item  -- DELETE ALL ITEMS NOT EXAMINED]    Constitutional: [x] Appears well-developed and well-nourished [x] No apparent distress      [] Abnormal -     Mental status: [x] Alert and awake  [x] Oriented to person/place/time [x] Able to follow commands    [] Abnormal -     Eyes:   EOM    []  Normal    [] Abnormal -   Sclera  [x]  Normal    [] Abnormal -          Discharge [x]  None visible   [] Abnormal -     HENT: [x] Normocephalic, atraumatic  [] Abnormal -   [x] Mouth/Throat: Mucous membranes are moist    External Ears [x] Normal  [] Abnormal -    Neck: [x] No visualized mass [] Abnormal -     Pulmonary/Chest: [x] Respiratory effort normal   [x] No visualized signs of difficulty breathing or respiratory distress        [] Abnormal -      Musculoskeletal:   [] Normal gait with no signs of ataxia         [x] Normal range of motion of neck        [] Abnormal -     Neurological:        [x] No Facial Asymmetry (Cranial nerve 7 motor function) (limited exam due to video visit)          [x] No gaze palsy        [] Abnormal -          Skin:        [x] No significant exanthematous lesions or discoloration noted on facial skin         [] Abnormal -            Psychiatric:       [x] Normal Affect [] Abnormal -        [x] No Hallucinations      ICD-10-CM ICD-9-CM    1. Tonsillitis  J03.90 463 clindamycin (CLEOCIN) 300 mg capsule   2. Thrush  B37.0 112.0 nystatin (MYCOSTATIN) 100,000 unit/mL suspension   3. Allergy, initial encounter  T78.40XA 995.3 cetirizine (ZYRTEC) 10 mg tablet     1. Tonsillitis  - clindamycin (CLEOCIN) 300 mg capsule; Take 1 Cap by mouth three (3) times daily for 7 days. Indications: tonsillitis  Dispense: 21 Cap; Refill: 0    2. Thrush  - nystatin (MYCOSTATIN) 100,000 unit/mL suspension; Take 5 mL by mouth four (4) times daily for 7 days. swish and spit  Indications: thrush  Dispense: 140 mL; Refill: 0    3. Allergy, initial encounter  - cetirizine (ZYRTEC) 10 mg tablet; Take 1 Tab by mouth daily. Indications: seasonal runny nose  Dispense: 30 Tab; Refill: 1    Think she has Tonsillitis. Has penicillin allergy. Clindamycin 300mg TID x 7 days. Cetirizine for allergies. Nystatin swish and swallow for thrush. Follow up 1 month. We discussed the expected course, resolution and complications of the diagnosis(es) in detail.   Medication risks, benefits, costs, interactions, and alternatives were discussed as indicated. I advised her to contact the office if her condition worsens, changes or fails to improve as anticipated. She expressed understanding with the diagnosis(es) and plan. Chika Quiros was evaluated through a synchronous (real-time) audio-video encounter. The patient (or guardian if applicable) is aware that this is a billable service. Verbal consent to proceed has been obtained within the past 12 months. The visit was conducted pursuant to the emergency declaration under the [address] [place] [address], [address] [place] [address] authority and the [employer] General Act. Patient identification was verified, and a caregiver was present when appropriate. The patient was located in a state where the provider was credentialed to provide care. I was in the office and pt was at home during visit. If symptoms worsen and necessary, call 911 or go to nearest ER.      Marli Ochoa NP

## 2021-11-04 ENCOUNTER — NURSE TRIAGE (OUTPATIENT)
Dept: OTHER | Facility: CLINIC | Age: 46
End: 2021-11-04

## 2021-11-04 NOTE — TELEPHONE ENCOUNTER
Received call from Elaine Gonzalez at [facility] with Red Flag Complaint. Brief description of triage: High blood pressure, see below    Triage indicates for patient to Go to ED now. Patient was at an outpatient treatment facility, and was with her caregiver, Remy Batista. Remy Batista is taking patient to nearest ED. Care advice provided, patient verbalizes understanding; denies any other questions or concerns; instructed to call back for any new or worsening symptoms. Attention Provider: Thank you for allowing me to participate in the care of your patient. The patient was connected to triage in response to information provided to the [facility]. Please do not respond through this encounter as the response is not directed to a shared pool. Reason for Disposition   Systolic BP >= 396 OR Diastolic >= 668, and any cardiac or neurologic symptoms (e.g., chest pain, difficulty breathing, unsteady gait, blurred vision)    Answer Assessment - Initial Assessment Questions  1. BLOOD PRESSURE: \"What is the blood pressure? \" \"Did you take at least two measurements 5 minutes apart?\"      211/152 taken a few minutes ago by a doctor at outpatient substance abuse center. Patient is with her caregiver, Remy Batista. Patient states a doctor  took 3 readings and all were high. 2. ONSET: \"When did you take your blood pressure? \"  Within the last 30 minutes        3. HOW: \"How did you obtain the blood pressure? \" (e.g., visiting nurse, automatic home BP monitor)      Doctor took it at the facility where patient is at (outpatient substance abuse)    4. HISTORY: \"Do you have a history of high blood pressure? \"      Yes per patient    5. MEDICATIONS: Gerald[place] Cam you taking any medications for blood pressure? \" \"Have you missed any doses recently? \"      Yes, patient denies missing any medications recently    6. OTHER SYMPTOMS: \"Do you have any symptoms? \" (e.g., headache, chest pain, blurred vision, difficulty breathing, weakness)  Headache, denies others 7. PREGNANCY: \"Is there any chance you are pregnant? \" \"When was your last menstrual period? \"      Denies, patient doesn't have a period    Protocols used: HIGH BLOOD PRESSURE-ADULT-[place]

## 2021-11-10 ENCOUNTER — OFFICE VISIT (OUTPATIENT)
Dept: INTERNAL MEDICINE CLINIC | Age: 46
End: 2021-11-10
Payer: MEDICAID

## 2021-11-10 VITALS
WEIGHT: 229 LBS | SYSTOLIC BLOOD PRESSURE: 136 MMHG | DIASTOLIC BLOOD PRESSURE: 85 MMHG | BODY MASS INDEX: 40.57 KG/M2 | OXYGEN SATURATION: 98 % | HEART RATE: 67 BPM | RESPIRATION RATE: 18 BRPM | HEIGHT: 63 IN | TEMPERATURE: 98.6 F

## 2021-11-10 DIAGNOSIS — L60.0 INGROWN TOENAIL OF RIGHT FOOT: ICD-10-CM

## 2021-11-10 DIAGNOSIS — R35.1 NOCTURIA: ICD-10-CM

## 2021-11-10 DIAGNOSIS — Z72.0 TOBACCO ABUSE: ICD-10-CM

## 2021-11-10 DIAGNOSIS — I10 ESSENTIAL HYPERTENSION: Primary | ICD-10-CM

## 2021-11-10 LAB
BILIRUB UR QL STRIP: NEGATIVE
GLUCOSE UR-MCNC: NEGATIVE MG/DL
KETONES P FAST UR STRIP-MCNC: NEGATIVE MG/DL
PH UR STRIP: 5.5 [PH] (ref 4.6–8)
PROT UR QL STRIP: NEGATIVE
SP GR UR STRIP: 1.03 (ref 1–1.03)
UA UROBILINOGEN AMB POC: NORMAL (ref 0.2–1)
URINALYSIS CLARITY POC: NORMAL
URINALYSIS COLOR POC: YELLOW
URINE BLOOD POC: NORMAL
URINE LEUKOCYTES POC: NEGATIVE
URINE NITRITES POC: NEGATIVE

## 2021-11-10 PROCEDURE — 81003 URINALYSIS AUTO W/O SCOPE: CPT | Performed by: FAMILY MEDICINE

## 2021-11-10 PROCEDURE — 99214 OFFICE O/P EST MOD 30 MIN: CPT | Performed by: FAMILY MEDICINE

## 2021-11-10 RX ORDER — AMLODIPINE BESYLATE 10 MG/1
10 TABLET ORAL DAILY
Qty: 90 TABLET | Refills: 1 | Status: SHIPPED | OUTPATIENT
Start: 2021-11-10 | End: 2022-07-14

## 2021-11-10 RX ORDER — ATORVASTATIN CALCIUM 40 MG/1
40 TABLET, FILM COATED ORAL DAILY
Qty: 90 TABLET | Refills: 1 | Status: SHIPPED | OUTPATIENT
Start: 2021-11-10 | End: 2022-09-01 | Stop reason: SDUPTHER

## 2021-11-10 RX ORDER — BUPROPION HYDROCHLORIDE 100 MG/1
100 TABLET ORAL 3 TIMES DAILY
Qty: 90 TABLET | Refills: 1 | Status: SHIPPED | OUTPATIENT
Start: 2021-11-10

## 2021-11-10 RX ORDER — METOPROLOL TARTRATE 25 MG/1
25 TABLET, FILM COATED ORAL 2 TIMES DAILY
Qty: 180 TABLET | Refills: 1 | Status: SHIPPED | OUTPATIENT
Start: 2021-11-10 | End: 2022-09-01 | Stop reason: SDUPTHER

## 2021-11-10 NOTE — PROGRESS NOTES
Subjective:     Frederick Sever is a 54 y.o. female who presents for follow up of hypertension. New concerns: admitted overnight for HA and HTN RE DC summary  Admitted 11/4 DC 11/5  C/o some urine freq specially at night, no dysuria, does not sleep well due to it. Toe pain times months nail ingrown    Diet and Lifestyle: smoker 0.5 pack daily    Cardiovascular ROS:   Reports taking blood pressure medications without side affects. No complaints of exertional chest pain, excessive shortness of breath or focal weakness. Minimal swelling in lower legs or dizziness with standing. Review of Systems, additional:  Pertinent items are noted in HPI. Patient Active Problem List    Diagnosis Date Noted    Narcotic dependency, continuous ([facility] [address] 10/27/2016    Chronic bilateral low back pain without sciatica 10/25/2016    Drug-seeking behavior 01/28/2016    BMI 40.0-44.9, adult ([facility] [address] 09/26/2014    Ingrown toenail 09/26/2014    Hypertension 03/26/2014     Allergies   Allergen Reactions    Ancef [Cefazolin] Hives    Pcn [Penicillins] Swelling     Past Medical History:   Diagnosis Date    Chronic back pain     x 1 year/heavy lifting    Endocrine disease     Hypothyroid    Headache(784.0)     Hypercholesterolemia     Hypertension     2 years ago    Prediabetes      Social History     Tobacco Use    Smoking status: Current Every Day Smoker     Packs/day: 0.25     Types: Cigarettes    Smokeless tobacco: Never Used   Substance Use Topics    Alcohol use: No                 Objective:     Physical exam significant for the following:     BP OK    Visit Vitals  /85 (BP 1 Location: Left arm, BP Patient Position: Sitting, BP Cuff Size: Large adult)   Pulse 67   Temp 98.6 °F (37 °C) (Temporal)   Resp 18   Ht 5' 3\" (1.6 m)   Wt 229 lb (103.9 kg)   SpO2 98%   BMI 40.57 kg/m²     WD WN female NAD  Heart RRR without murmers clicks or rubs  Lungs CTA  Abdo soft nontender  Ext no edema    .    Assessment/Plan: hypertension well controlled, stable. ICD-10-CM ICD-9-CM    1. Essential hypertension  I10 401.9 amLODIPine (NORVASC) 10 mg tablet      atorvastatin (LIPITOR) 40 mg tablet      metoprolol tartrate (LOPRESSOR) 25 mg tablet      AMB POC URINALYSIS DIP STICK AUTO W/O MICRO   2. Nocturia  R35.1 788.43 AMB POC URINALYSIS DIP STICK AUTO W/O MICRO      CANCELED: AMB POC URINALYSIS DIP STICK MANUAL W/O MICRO   3. Ingrown toenail of right foot  L60.0 703.0 AMB POC URINALYSIS DIP STICK AUTO W/O MICRO   4. Tobacco abuse  Z72.0 305.1 buPROPion (WELLBUTRIN) 100 mg tablet      AMB POC URINALYSIS DIP STICK AUTO W/O MICRO       Orders Placed This Encounter    AMB POC URINALYSIS DIP STICK AUTO W/O MICRO    amLODIPine (NORVASC) 10 mg tablet     Sig: Take 1 Tablet by mouth daily. Dispense:  90 Tablet     Refill:  1    atorvastatin (LIPITOR) 40 mg tablet     Sig: Take 1 Tablet by mouth daily. Dispense:  90 Tablet     Refill:  1    metoprolol tartrate (LOPRESSOR) 25 mg tablet     Sig: Take 1 Tablet by mouth two (2) times a day. Indications: hold for HR less than 55 or SBP less than 90     Dispense:  180 Tablet     Refill:  1    buPROPion (WELLBUTRIN) 100 mg tablet     Sig: Take 1 Tablet by mouth three (3) times daily. Start 1 tablet daily, usually in the evening, every few days increase as tolerated, quit     Dispense:  90 Tablet     Refill:  1       See patient instructions, went over them personally with the patient. Emphasized compliance. Questions answered. Patient states that they understand the plan of action and will call if there are any issues or misunderstandings. The patient was counseled on the dangers of tobacco use, and was advised to quit. Reviewed strategies to maximize success, including pharmacotherapy (wellbutrin).     Follow-up 2 weeks for nails

## 2021-11-10 NOTE — PROGRESS NOTES
Chief Complaint   Patient presents with    Elevated Blood Pressure     patient was in the hospital at [facility] last week for elevated blood pressure  - FU today      Fall Risk Assessment, last 12 mths 1/24/2018   Able to walk? Yes   Fall in past 12 months? No       3 most recent PHQ Screens 11/10/2021   Little interest or pleasure in doing things Not at all   Feeling down, depressed, irritable, or hopeless Not at all   Total Score PHQ 2 0       Abuse Screening Questionnaire 11/10/2021   Do you ever feel afraid of your partner? N   Are you in a relationship with someone who physically or mentally threatens you? N   Is it safe for you to go home?  Y       ADL Assessment 11/10/2021   Feeding yourself No Help Needed   Getting from bed to chair No Help Needed   Getting dressed No Help Needed   Bathing or showering No Help Needed   Walk across the room (includes cane/walker) No Help Needed   Using the telphone No Help Needed   Taking your medications No Help Needed   Preparing meals No Help Needed   Managing money (expenses/bills) No Help Needed   Moderately strenuous housework (laundry) No Help Needed   Shopping for personal items (toiletries/medicines) No Help Needed   Shopping for groceries No Help Needed   Driving No Help Needed   Climbing a flight of stairs No Help Needed   Getting to places beyond walking distances No Help Needed

## 2021-11-10 NOTE — PATIENT INSTRUCTIONS
Deciding About Using Medicines To Quit Smoking  How can you decide about using medicines to quit smoking? What are the medicines you can use? Your doctor may prescribe varenicline (Chantix) or bupropion (Zyban). These medicines can help you cope with cravings for tobacco. They are pills that don't contain nicotine. You also can use nicotine replacement products. These do contain nicotine. There are many types. · Gum and lozenges slowly release nicotine into your mouth. · Patches stick to your skin. They slowly release nicotine into your bloodstream.  · An inhaler has a gonsalves that contains nicotine. You breathe in a puff of nicotine vapor through your mouth and throat. · Nasal spray releases a mist that contains nicotine. What are key points about this decision? · Using medicines can double your chances of quitting smoking. They can ease cravings and withdrawal symptoms. · Getting counseling along with using medicine can raise your chances of quitting even more. · If you smoke fewer than 5 cigarettes a day, you may not need medicines to help you quit smoking. · These medicines have less nicotine than cigarettes. And by itself, nicotine is not nearly as harmful as smoking. The tars, carbon monoxide, and other toxic chemicals in tobacco cause the harmful effects. · The side effects of nicotine replacement products depend on the type of product. For example, a patch can make your skin red and itchy. Medicines in pill form can make you sick to your stomach. They can also cause dry mouth and trouble sleeping. For most people, the side effects are not bad enough to make them stop using the products. Why might you choose to use medicines to quit smoking? · You have tried on your own to stop smoking, but you were not able to stop. · You smoke more than 5 cigarettes a day. · You want to increase your chances of quitting smoking. · You want to reduce your cravings and withdrawal symptoms.   · You feel the benefits of medicine outweigh the side effects. Why might you choose not to use medicine? · You want to try quitting on your own by stopping all at once (\"cold turkey\"). · You want to cut back slowly on the number of cigarettes you smoke. · You smoke fewer than 5 cigarettes a day. · You do not like using medicine. · You feel the side effects of medicines outweigh the benefits. · You are worried about the cost of medicines. Your decision  Thinking about the facts and your feelings can help you make a decision that is right for you. Be sure you understand the benefits and risks of your options, and think about what else you need to do before you make the decision. Where can you learn more? Go to http://www.gray.com/  Enter H439 in the search box to learn more about \"Deciding About Using Medicines To Quit Smoking. \"  Current as of: February 11, 2021               Content Version: 13.0  © 1465-3481 [employer]. Care instructions adapted under license by [employer] (which disclaims liability or warranty for this information). If you have questions about a medical condition or this instruction, always ask your healthcare professional. [place] [address] any warranty or liability for your use of this information. Frequent Urination: Care Instructions  Your Care Instructions  An urge to urinate frequently but usually passing only small amounts of urine is a common symptom of urinary problems, such as urinary tract infections. The bladder may become inflamed. This can cause the urge to urinate. You may try to urinate more often than usual to try to soothe that urge. Frequent urination also may be caused by sexually transmitted infections (STIs) or kidney stones. Or it may happen when something irritates the tube that carries urine from the bladder to the outside of the body (urethra). It may also be a sign of diabetes.   The cause may be hard to find. You may need tests. Follow-up care is a key part of your treatment and safety. Be sure to make and go to all appointments, and call your doctor if you are having problems. It's also a good idea to know your test results and keep a list of the medicines you take. How can you care for yourself at home? · Drink extra water for the next day or two. This will help make the urine less concentrated. (If you have kidney, heart, or liver disease and have to limit fluids, talk with your doctor before you increase the amount of fluids you drink.)  · Avoid drinks that are carbonated or have caffeine. They can irritate the bladder. For women:  · Urinate right after you have sex. · After you go to the bathroom, wipe from front to back. · Avoid douches, bubble baths, and feminine hygiene sprays. And avoid other feminine hygiene products that have deodorants. When should you call for help? Call your doctor now or seek immediate medical care if:    · You have new symptoms, such as fever, nausea, or vomiting.     · You have new or worse symptoms of a urinary problem. For example:  ? You have blood or pus in your urine. ? You have chills or body aches. ? It hurts to urinate. ? You have groin or belly pain. ? You have pain in your back just below your rib cage (the flank area). Watch closely for changes in your health, and be sure to contact your doctor if you feel thirstier than usual.  Where can you learn more? Go to http://www.gray.com/  Enter I431 in the search box to learn more about \"Frequent Urination: Care Instructions. \"  Current as of: February 10, 2021               Content Version: 13.0  © 2836-5958 [employer]. Care instructions adapted under license by [employer] (which disclaims liability or warranty for this information).  If you have questions about a medical condition or this instruction, always ask your healthcare professional. [facility], [facility] disclaims any warranty or liability for your use of this information. Kegel Exercises: Care Instructions  Overview     Kegel exercises strengthen muscles around the bladder. These muscles control the flow of urine. Kegel exercises are sometime called \"pelvic floor\" exercises. They can help prevent urine leakage and keep the pelvic organs in place. Kegel exercises can strengthen pelvic muscles that have been weakened by age, pregnancy, childbirth, and surgery. They may help prevent or treat urine leakage. You do Kegel exercises by tightening the muscles you use when you urinate. You will likely need to do these exercises for several weeks to get better. Follow-up care is a key part of your treatment and safety. Be sure to make and go to all appointments, and call your doctor if you are having problems. It's also a good idea to know your test results and keep a list of the medicines you take. How can you care for yourself at home? · Do Kegel exercises. ? Find the muscles you need to strengthen. To do this, tighten the muscles that stop your urine while you are going to the bathroom. These are the same muscles you squeeze during Kegel exercises. ? Squeeze the muscles as hard as you can. Your belly and thighs should not move. ? Hold the squeeze for 3 seconds. Then relax for 3 seconds. ? Start with 3 seconds, and then add 1 second each week until you are able to squeeze for 10 seconds. ? Repeat the exercise 10 to 15 times for each session. Do three or more sessions each day. · You can check to see if you are using the right muscles. ? Tighten the muscles that help you stop passing gas or keep you from urinating. Make sure you aren't using your stomach, leg, or buttock muscles. ? Place a finger in your vagina and squeeze around it. You are doing them right when you feel pressure around your finger.  Your doctor may also suggest that you put special weights in your vagina while you do the exercises. · Check with your doctor if you don't notice a difference after trying these exercises for several weeks. Your doctor may suggest getting help from a physical therapist or recommend other treatment. Where can you learn more? Go to http://www.gray.com/  Enter L321 in the search box to learn more about \"Kegel Exercises: Care Instructions. \"  Current as of: February 10, 2021               Content Version: 13.0  © 2006-2021 [employer]. Care instructions adapted under license by [employer] (which disclaims liability or warranty for this information). If you have questions about a medical condition or this instruction, always ask your healthcare professional. [address] any warranty or liability for your use of this information.

## 2022-08-17 DIAGNOSIS — I10 ESSENTIAL HYPERTENSION: ICD-10-CM

## 2022-08-17 RX ORDER — AMLODIPINE BESYLATE 10 MG/1
10 TABLET ORAL DAILY
Qty: 90 TABLET | Refills: 4 | OUTPATIENT
Start: 2022-08-17

## 2022-08-17 RX ORDER — METOPROLOL TARTRATE 25 MG/1
TABLET, FILM COATED ORAL
Qty: 180 TABLET | Refills: 4 | OUTPATIENT
Start: 2022-08-17

## 2022-08-17 RX ORDER — ATORVASTATIN CALCIUM 40 MG/1
40 TABLET, FILM COATED ORAL DAILY
Qty: 90 TABLET | Refills: 4 | OUTPATIENT
Start: 2022-08-17

## 2022-08-29 NOTE — TELEPHONE ENCOUNTER
Needs an in office FU to check Blood pressure before refilling BP medications - scheduled with CTankersley NP for 9/1/2022 at 230pm  Arina Bolden LPN  4/82/6431  3:57 PM

## 2022-09-01 ENCOUNTER — OFFICE VISIT (OUTPATIENT)
Dept: INTERNAL MEDICINE CLINIC | Age: 47
End: 2022-09-01
Payer: MEDICAID

## 2022-09-01 VITALS
RESPIRATION RATE: 20 BRPM | OXYGEN SATURATION: 96 % | BODY MASS INDEX: 43.23 KG/M2 | WEIGHT: 244 LBS | TEMPERATURE: 98.2 F | DIASTOLIC BLOOD PRESSURE: 85 MMHG | HEIGHT: 63 IN | SYSTOLIC BLOOD PRESSURE: 149 MMHG | HEART RATE: 56 BPM

## 2022-09-01 DIAGNOSIS — E78.5 HYPERLIPIDEMIA, UNSPECIFIED HYPERLIPIDEMIA TYPE: ICD-10-CM

## 2022-09-01 DIAGNOSIS — Z11.59 NEED FOR HEPATITIS C SCREENING TEST: ICD-10-CM

## 2022-09-01 DIAGNOSIS — Z76.89 ESTABLISHING CARE WITH NEW DOCTOR, ENCOUNTER FOR: ICD-10-CM

## 2022-09-01 DIAGNOSIS — F32.A DEPRESSION, UNSPECIFIED DEPRESSION TYPE: ICD-10-CM

## 2022-09-01 DIAGNOSIS — I10 PRIMARY HYPERTENSION: Primary | ICD-10-CM

## 2022-09-01 DIAGNOSIS — I10 ESSENTIAL HYPERTENSION: ICD-10-CM

## 2022-09-01 DIAGNOSIS — Z76.0 MEDICATION REFILL: ICD-10-CM

## 2022-09-01 PROCEDURE — 99204 OFFICE O/P NEW MOD 45 MIN: CPT | Performed by: NURSE PRACTITIONER

## 2022-09-01 RX ORDER — ATORVASTATIN CALCIUM 40 MG/1
40 TABLET, FILM COATED ORAL DAILY
Qty: 90 TABLET | Refills: 1 | Status: SHIPPED | OUTPATIENT
Start: 2022-09-01

## 2022-09-01 RX ORDER — METOPROLOL TARTRATE 25 MG/1
25 TABLET, FILM COATED ORAL 2 TIMES DAILY
Qty: 180 TABLET | Refills: 1 | Status: SHIPPED | OUTPATIENT
Start: 2022-09-01

## 2022-09-01 NOTE — PROGRESS NOTES
Yolis Sierra (: 1975) is a 51 y.o. female is here for evaluation of the following chief complaint(s): Blood Pressure Check (C/o muscle cramps and swelling ankles)  Pt presents to the clinic for blood pressure check and swelling in ankles and muscle cramps. Pt has been a patient previously of Lelo Abarca and Dr Yudith Ramon who will be switching to my service. Will update labs since not done since 2021. Symptoms began would of days ago. Pt also would like refills of Metoprolol and Lipitor. Subjective/Objective:   Ana Mack was seen today for Blood Pressure Check (C/o muscle cramps and swelling ankles)      Review of Systems   Constitutional: Negative. HENT: Negative. Eyes: Negative. Respiratory: Negative. Cardiovascular:  Positive for leg swelling. Gastrointestinal: Negative. Musculoskeletal:         Muscle cramps    Skin: Negative. Neurological: Negative. Endo/Heme/Allergies: Negative. Psychiatric/Behavioral: Negative. Physical Exam  Vitals reviewed. Constitutional:       General: She is not in acute distress. Appearance: She is obese. She is not ill-appearing. HENT:      Head: Normocephalic and atraumatic. Right Ear: External ear normal.      Left Ear: External ear normal.      Nose: Nose normal.      Mouth/Throat:      Mouth: Mucous membranes are moist.   Eyes:      Conjunctiva/sclera: Conjunctivae normal.   Cardiovascular:      Rate and Rhythm: Regular rhythm. Bradycardia present. Pulses: Normal pulses. Heart sounds: Normal heart sounds. Pulmonary:      Effort: Pulmonary effort is normal.      Breath sounds: Normal breath sounds. Abdominal:      Palpations: Abdomen is soft. Musculoskeletal:         General: Normal range of motion. Cervical back: Normal range of motion and neck supple. Skin:     General: Skin is warm and dry. Neurological:      General: No focal deficit present.       Mental Status: She is alert and oriented to person, place, and time. Psychiatric:         Mood and Affect: Mood normal.         Behavior: Behavior normal.         Thought Content: Thought content normal.         Judgment: Judgment normal.        BP (!) 149/85 (BP 1 Location: Left arm, BP Patient Position: Sitting, BP Cuff Size: Large adult)   Pulse (!) 56   Temp 98.2 °F (36.8 °C) (Temporal)   Resp 20   Ht 5' 3\" (1.6 m)   Wt 244 lb (110.7 kg)   SpO2 96%   BMI 43.22 kg/m²      No visits with results within 6 Month(s) from this visit.    Latest known visit with results is:   Office Visit on 11/10/2021   Component Date Value Ref Range Status    Color (UA POC) 11/10/2021 Yellow   Final    Clarity (UA POC) 11/10/2021 Cloudy   Final    Glucose (UA POC) 11/10/2021 Negative  Negative Final    Bilirubin (UA POC) 11/10/2021 Negative  Negative Final    Ketones (UA POC) 11/10/2021 Negative  Negative Final    Specific gravity (UA POC) 11/10/2021 1.030  1.001 - 1.035 Final    Blood (UA POC) 11/10/2021 Trace  Negative Final    pH (UA POC) 11/10/2021 5.5  4.6 - 8.0 Final    Protein (UA POC) 11/10/2021 Negative  Negative Final    Urobilinogen (UA POC) 11/10/2021 1 mg/dL  0.2 - 1 Final    Nitrites (UA POC) 11/10/2021 Negative  Negative Final    Leukocyte esterase (UA POC) 11/10/2021 Negative  Negative Final         Past Surgical History:   Procedure Laterality Date    HX BREAST REDUCTION  2004    HX CHOLECYSTECTOMY      AGe 25    HX GYN      hysterectomy/2008    HX GYN  2005 and 2006    Tubal pregnacy    ID ABDOMEN SURGERY PROC UNLISTED      hernia/4 years ago        Past Medical History:   Diagnosis Date    Chronic back pain     x 1 year/heavy lifting    Endocrine disease     Hypothyroid    Headache(784.0)     Hypercholesterolemia     Hypertension     2 years ago    Prediabetes         Current Outpatient Medications   Medication Instructions    amLODIPine (NORVASC) 10 mg, Oral, DAILY    atorvastatin (LIPITOR) 40 mg, Oral, DAILY    buPROPion (WELLBUTRIN) 100 mg, Oral, 3 TIMES DAILY, Start 1 tablet daily, usually in the evening, every few days increase as tolerated, quit    cetirizine (ZYRTEC) 10 mg, Oral, DAILY    metoprolol tartrate (LOPRESSOR) 25 mg, Oral, 2 TIMES DAILY    Suboxone 2-0.5 mg film sublingual film DISSOLVE 1 FILM BY MOUTH TWICE DAILY        Assessment/Plan:     The above diagnosis is a new problem. We discussed expected course, resolution, and complications of diagnosis in detail. I advised her to call back or return to office if symptoms worsen/change/persist.        ICD-10-CM ICD-9-CM    1. Primary hypertension  K87 911.3 METABOLIC PANEL, COMPREHENSIVE      TSH 3RD GENERATION      CBC WITH AUTOMATED DIFF      URINALYSIS W/ RFLX MICROSCOPIC      METABOLIC PANEL, COMPREHENSIVE      TSH 3RD GENERATION      CBC WITH AUTOMATED DIFF      URINALYSIS W/ RFLX MICROSCOPIC      2. BMI 40.0-44.9, adult ([facility] [address]  Z68.41 V85.41 HEMOGLOBIN A1C WITH EAG      HEMOGLOBIN A1C WITH EAG      3. Hyperlipidemia, unspecified hyperlipidemia type  E78.5 272.4 LIPID PANEL      LIPID PANEL      4. Depression, unspecified depression type  F32. A 311       5. Establishing care with new doctor, encounter for  Z76.89 V65.8       6. Medication refill  Z76.0 V68.1       7. Essential hypertension  I10 401.9 metoprolol tartrate (LOPRESSOR) 25 mg tablet      atorvastatin (LIPITOR) 40 mg tablet      8. Need for hepatitis C screening test  Z11.59 V73.89 HEPATITIS C AB, RFLX TO QT BY PCR      HEPATITIS C AB, RFLX TO QT BY PCR         Return in about 2 weeks (around 9/15/2022). An electronic signature was used to authenticate this note.   -- Hannah Watts NP

## 2022-09-01 NOTE — PATIENT INSTRUCTIONS
It was a pleasure meeting you. Please have labs drawn at [facility] and return in 2 weeks to discuss results.       Monet Mejia NP

## 2022-09-01 NOTE — PROGRESS NOTES
Chief Complaint   Patient presents with    Blood Pressure Check     C/o muscle cramps and swelling ankles

## 2022-09-17 ENCOUNTER — APPOINTMENT (OUTPATIENT)
Dept: CT IMAGING | Age: 47
End: 2022-09-17
Attending: EMERGENCY MEDICINE
Payer: MEDICAID

## 2022-09-17 ENCOUNTER — HOSPITAL ENCOUNTER (EMERGENCY)
Age: 47
Discharge: HOME | End: 2022-09-17
Attending: EMERGENCY MEDICINE
Payer: MEDICAID

## 2022-09-17 VITALS
OXYGEN SATURATION: 98 % | BODY MASS INDEX: 44.16 KG/M2 | HEIGHT: 62 IN | TEMPERATURE: 98.3 F | SYSTOLIC BLOOD PRESSURE: 143 MMHG | WEIGHT: 240 LBS | HEART RATE: 62 BPM | RESPIRATION RATE: 16 BRPM | DIASTOLIC BLOOD PRESSURE: 73 MMHG

## 2022-09-17 DIAGNOSIS — S06.0X0D CONCUSSION WITHOUT LOSS OF CONSCIOUSNESS, SUBSEQUENT ENCOUNTER: Primary | ICD-10-CM

## 2022-09-17 PROCEDURE — 70450 CT HEAD/BRAIN W/O DYE: CPT

## 2022-09-17 PROCEDURE — 74011250636 HC RX REV CODE- 250/636: Performed by: EMERGENCY MEDICINE

## 2022-09-17 PROCEDURE — 99284 EMERGENCY DEPT VISIT MOD MDM: CPT

## 2022-09-17 PROCEDURE — 74011250637 HC RX REV CODE- 250/637: Performed by: EMERGENCY MEDICINE

## 2022-09-17 RX ORDER — MECLIZINE HYDROCHLORIDE 25 MG/1
25 TABLET ORAL
Qty: 20 TABLET | Refills: 0 | Status: SHIPPED | OUTPATIENT
Start: 2022-09-17 | End: 2022-10-04 | Stop reason: ALTCHOICE

## 2022-09-17 RX ORDER — BUPRENORPHINE HYDROCHLORIDE AND NALOXONE HYDROCHLORIDE DIHYDRATE 8; 2 MG/1; MG/1
TABLET SUBLINGUAL
COMMUNITY
Start: 2022-08-25 | End: 2022-09-17

## 2022-09-17 RX ORDER — ONDANSETRON 4 MG/1
4 TABLET, ORALLY DISINTEGRATING ORAL
Status: COMPLETED | OUTPATIENT
Start: 2022-09-17 | End: 2022-09-17

## 2022-09-17 RX ORDER — BUTALBITAL, ACETAMINOPHEN AND CAFFEINE 50; 325; 40 MG/1; MG/1; MG/1
1 TABLET ORAL
Qty: 20 TABLET | Refills: 0 | Status: SHIPPED | OUTPATIENT
Start: 2022-09-17 | End: 2022-10-04 | Stop reason: SDUPTHER

## 2022-09-17 RX ORDER — BUTALBITAL, ACETAMINOPHEN AND CAFFEINE 50; 325; 40 MG/1; MG/1; MG/1
1 TABLET ORAL
Status: COMPLETED | OUTPATIENT
Start: 2022-09-17 | End: 2022-09-17

## 2022-09-17 RX ORDER — ONDANSETRON 4 MG/1
4 TABLET, ORALLY DISINTEGRATING ORAL
Qty: 15 TABLET | Refills: 0 | Status: SHIPPED | OUTPATIENT
Start: 2022-09-17 | End: 2022-10-04 | Stop reason: ALTCHOICE

## 2022-09-17 RX ORDER — MECLIZINE HYDROCHLORIDE 25 MG/1
25 TABLET ORAL
Status: COMPLETED | OUTPATIENT
Start: 2022-09-17 | End: 2022-09-17

## 2022-09-17 RX ORDER — BUPRENORPHINE HYDROCHLORIDE AND NALOXONE HYDROCHLORIDE DIHYDRATE 8; 2 MG/1; MG/1
1 TABLET SUBLINGUAL 2 TIMES DAILY
COMMUNITY

## 2022-09-17 RX ADMIN — BUTALBITAL, ACETAMINOPHEN, AND CAFFEINE 1 TABLET: 50; 325; 40 TABLET ORAL at 16:13

## 2022-09-17 RX ADMIN — ONDANSETRON 4 MG: 4 TABLET, ORALLY DISINTEGRATING ORAL at 16:13

## 2022-09-17 RX ADMIN — MECLIZINE HYDROCHLORIDE 25 MG: 25 TABLET ORAL at 16:13

## 2022-09-17 NOTE — ED TRIAGE NOTES
Trip and fall backwards 5 days ago , c/c pain in head , right side, and n/v starting the next day . Steady gait , pupils , equal,constricted 1 and reactive.  Takes 8mg Suboxone BID

## 2022-09-17 NOTE — ED NOTES
Patient educated on the medication(s) he/she is going to receive, allergies reviewed/verified and patient medicated as ordered. Side rails up and call light within reach. Will continue to monitor.   Pt awaits for results, provider re-eval and dispo

## 2022-09-17 NOTE — ED PROVIDER NOTES
EMERGENCY DEPARTMENT HISTORY AND PHYSICAL EXAM      Date: 9/17/2022  Patient Name: Bello Morejon    History of Presenting Illness     Chief Complaint   Patient presents with    Head Injury       History Provided By: Patient    HPI: Bello Morejon, 51 y.o. female with PMHx significant for hypertension, hyperlipidemia, hypothyroid, who presents with a chief complaint of headache, dizziness, nausea, and vomiting. Patient reports that she slipped and fell in the grocery store 5 days ago. She did strike her head but did not lose consciousness. She was seen the following day at an outside facility where she had negative CT imaging of her head, C-spine, chest abdomen and pelvis. She was having similar symptoms at that time. She reports symptoms have been ongoing and she became concerned so came back to the ED for reevaluation. She denies any unilateral numbness or weakness. States she only feels dizzy when she moves her head quickly. She reports some ongoing mild lower back pain as well. PCP: Katie Guzman, NP    There are no other complaints, changes, or physical findings at this time. Current Outpatient Medications   Medication Sig Dispense Refill    buprenorphine-naloxone 8-2 mg subl 1 Tablet by SubLINGual route two (2) times a day. ondansetron (ZOFRAN ODT) 4 mg disintegrating tablet Take 1 Tablet by mouth every eight (8) hours as needed for Nausea or Vomiting. 15 Tablet 0    butalbital-acetaminophen-caffeine (FIORICET, ESGIC) -40 mg per tablet Take 1 Tablet by mouth every six (6) hours as needed for Headache. Indications: a migraine headache 20 Tablet 0    meclizine (ANTIVERT) 25 mg tablet Take 1 Tablet by mouth three (3) times daily as needed for Dizziness. 20 Tablet 0    metoprolol tartrate (LOPRESSOR) 25 mg tablet Take 1 Tablet by mouth two (2) times a day.  Indications: hold for HR less than 55 or SBP less than 90 180 Tablet 1    atorvastatin (LIPITOR) 40 mg tablet Take 1 Tablet by mouth daily. 90 Tablet 1    amLODIPine (NORVASC) 10 mg tablet TAKE 1 TABLET BY MOUTH DAILY. 90 Tablet 0    buPROPion (WELLBUTRIN) 100 mg tablet Take 1 Tablet by mouth three (3) times daily. Start 1 tablet daily, usually in the evening, every few days increase as tolerated, quit 90 Tablet 1    cetirizine (ZYRTEC) 10 mg tablet Take 1 Tab by mouth daily. Indications: seasonal runny nose (Patient not taking: No sig reported) 30 Tab 1     Past History     Past Medical History:  Past Medical History:   Diagnosis Date    Chronic back pain     x 1 year/heavy lifting    Endocrine disease     Hypothyroid    Headache(784.0)     Hypercholesterolemia     Hypertension     2 years ago    Prediabetes      Past Surgical History:  Past Surgical History:   Procedure Laterality Date    HX BREAST REDUCTION  2004    HX CHOLECYSTECTOMY      AGe 24    HX GYN      hysterectomy/2008    HX GYN  2005 and 2006    Tubal pregnacy    [place] ABDOMEN SURGERY PROC UNLISTED      hernia/4 years ago     Family History:  Family History   Problem Relation Age of Onset    Kidney Disease Mother     Cancer Father         lung    Hypertension Maternal Aunt     Kidney Disease Maternal Uncle     Hypertension Maternal Grandmother     High Cholesterol Maternal Grandmother     Arthritis-rheumatoid Maternal Grandmother     Thyroid Disease Maternal Grandmother     Asthma Sister      Social History:  Social History     Tobacco Use    Smoking status: Every Day     Packs/day: 0.25     Types: Cigarettes    Smokeless tobacco: Never   Vaping Use    Vaping Use: Never used   Substance Use Topics    Alcohol use: No    Drug use: No     Allergies: Allergies   Allergen Reactions    Ancef [Cefazolin] Hives    Pcn [Penicillins] Swelling     Review of Systems   Review of Systems   Constitutional:  Negative for chills and fever. HENT:  Negative for congestion, rhinorrhea and sore throat. Respiratory:  Negative for cough and shortness of breath.     Cardiovascular:  Negative for chest pain. Gastrointestinal:  Positive for nausea and vomiting. Negative for abdominal pain. Genitourinary:  Negative for dysuria and urgency. Musculoskeletal:  Positive for back pain. Skin:  Negative for rash. Neurological:  Positive for dizziness, light-headedness and headaches. All other systems reviewed and are negative. Physical Exam   Physical Exam  Vitals and nursing note reviewed. Constitutional:       General: She is not in acute distress. Appearance: She is well-developed. HENT:      Head: Normocephalic and atraumatic. Eyes:      Conjunctiva/sclera: Conjunctivae normal.      Pupils: Pupils are equal, round, and reactive to light. Cardiovascular:      Rate and Rhythm: Normal rate and regular rhythm. Pulmonary:      Effort: Pulmonary effort is normal. No respiratory distress. Breath sounds: Normal breath sounds. No stridor. Abdominal:      General: There is no distension. Palpations: Abdomen is soft. Tenderness: There is no abdominal tenderness. Musculoskeletal:         General: Normal range of motion. Cervical back: Normal range of motion. Skin:     General: Skin is warm and dry. Neurological:      Mental Status: She is alert and oriented to person, place, and time. Psychiatric:         Mood and Affect: Mood normal.         Thought Content: Thought content normal.     Diagnostic Study Results   Labs -   No results found for this or any previous visit (from the past 12 hour(s)). Radiologic Studies -   CT HEAD WO CONT   Final Result   No acute findings. CT HEAD WO CONT    Result Date: 9/17/2022  No acute findings. Medical Decision Making   I am the first provider for this patient. I reviewed the vital signs, available nursing notes, past medical history, past surgical history, family history and social history. Vital Signs-Reviewed the patient's vital signs.   Patient Vitals for the past 12 hrs:   Temp Pulse Resp BP SpO2   09/17/22 1625 -- 62 -- (!) 143/73 --   09/17/22 1538 98.3 °F (36.8 °C) 65 16 (!) 172/91 98 %       Pulse Oximetry Analysis - 98% on ra    Records Reviewed: Nursing Notes and Old Medical Records    Provider Notes (Medical Decision Making):   Patient presents with a chief complaint of headache, nausea, vomiting, dizziness following head trauma 5 days ago. She has had a set of negative imaging since that time but due to persistent symptoms we will repeat head CT to ensure no delayed presentation of intracranial process but I find this less likely. Suspect she likely sustained a concussion during her fall. will medicate for symptoms and reevaluate. ED Course:   Initial assessment performed. The patients presenting problems have been discussed, and they are in agreement with the care plan formulated and outlined with them. I have encouraged them to ask questions as they arise throughout their visit. ED Course as of 09/17/22 1804   Sat Sep 17, 2022   1659 Patient reevaluated. Feeling significantly improved. [[place]]      ED Course User Index  Prema Dotson MD       Procedures:  Procedures    Critical Care:  none    Disposition:  Discharge Note:  The patient has been re-evaluated and is ready for discharge. Reviewed available results with patient. Counseled patient on diagnosis and care plan. Patient has expressed understanding, and all questions have been answered. Patient agrees with plan and agrees to follow up as recommended, or to return to the ED if their symptoms worsen. Discharge instructions have been provided and explained to the patient, along with reasons to return to the ED. PLAN:  1.    Discharge Medication List as of 9/17/2022  5:02 PM        START taking these medications    Details   ondansetron (ZOFRAN ODT) 4 mg disintegrating tablet Take 1 Tablet by mouth every eight (8) hours as needed for Nausea or Vomiting., Normal, Disp-15 Tablet, R-0      butalbital-acetaminophen-caffeine (FIORICET, ESGIC) -40 mg per tablet Take 1 Tablet by mouth every six (6) hours as needed for Headache. Indications: a migraine headache, Normal, Disp-20 Tablet, R-0      meclizine (ANTIVERT) 25 mg tablet Take 1 Tablet by mouth three (3) times daily as needed for Dizziness., Normal, Disp-20 Tablet, R-0           CONTINUE these medications which have NOT CHANGED    Details   buprenorphine-naloxone 8-2 mg subl 1 Tablet by SubLINGual route two (2) times a day., Historical Med      metoprolol tartrate (LOPRESSOR) 25 mg tablet Take 1 Tablet by mouth two (2) times a day. Indications: hold for HR less than 55 or SBP less than 90, Normal, Disp-180 Tablet, R-1      atorvastatin (LIPITOR) 40 mg tablet Take 1 Tablet by mouth daily. , Normal, Disp-90 Tablet, R-1      amLODIPine (NORVASC) 10 mg tablet TAKE 1 TABLET BY MOUTH DAILY., Normal, Disp-90 Tablet, R-0Appointment needed to refill this medication again, virtual or phone visit. buPROPion (WELLBUTRIN) 100 mg tablet Take 1 Tablet by mouth three (3) times daily. Start 1 tablet daily, usually in the evening, every few days increase as tolerated, quit, Normal, Disp-90 Tablet, R-1      cetirizine (ZYRTEC) 10 mg tablet Take 1 Tab by mouth daily. Indications: seasonal runny nose, Normal, Disp-30 Tab, R-1           2. Follow-up Information       Follow up With Specialties Details Why Contact Info    Ga Roblero NP Nurse Practitioner Schedule an appointment as soon as possible for a visit   [address] [facility] [address] [phone]      [address] [facility]  As needed, If symptoms worsen [address] [facility] [address]          Return to ED if worse     Diagnosis     Clinical Impression:   1. Concussion without loss of consciousness, subsequent encounter            Please note that this dictation was completed with Walk[employer], the [employer] voice recognition software.   Quite often unanticipated grammatical, syntax, homophones, and other interpretive errors are inadvertently transcribed by the computer software. Please disregard these errors.   Please excuse any errors that have escaped final proofreading

## 2022-09-17 NOTE — Clinical Note
[address] [place] [address] EMERGENCY DEP  [address] [facility] [address] [place] [address]  [phone]    Work/School Note    Date: 9/17/2022    To Whom It May concern:    Yolis Sierra was seen and treated today in the emergency room by the following provider(s):  Attending Provider: Ghada Guaman MD.      Yolis Sierra is excused from work/school on 09/17/22 and 09/18/22. She is medically clear to return to work/school on 9/19/2022.        Sincerely,          Jenelle Naqvi MD

## 2022-10-03 ENCOUNTER — NURSE TRIAGE (OUTPATIENT)
Dept: OTHER | Facility: CLINIC | Age: 47
End: 2022-10-03

## 2022-10-04 ENCOUNTER — OFFICE VISIT (OUTPATIENT)
Dept: INTERNAL MEDICINE CLINIC | Age: 47
End: 2022-10-04
Payer: MEDICAID

## 2022-10-04 VITALS
HEIGHT: 62 IN | DIASTOLIC BLOOD PRESSURE: 87 MMHG | HEART RATE: 75 BPM | RESPIRATION RATE: 20 BRPM | BODY MASS INDEX: 45.82 KG/M2 | TEMPERATURE: 99.6 F | SYSTOLIC BLOOD PRESSURE: 150 MMHG | OXYGEN SATURATION: 94 % | WEIGHT: 249 LBS

## 2022-10-04 DIAGNOSIS — F07.81 POST CONCUSSION SYNDROME: Primary | ICD-10-CM

## 2022-10-04 DIAGNOSIS — T78.40XA ALLERGY, INITIAL ENCOUNTER: ICD-10-CM

## 2022-10-04 PROCEDURE — 99213 OFFICE O/P EST LOW 20 MIN: CPT | Performed by: NURSE PRACTITIONER

## 2022-10-04 RX ORDER — BUTALBITAL, ACETAMINOPHEN AND CAFFEINE 50; 325; 40 MG/1; MG/1; MG/1
1 TABLET ORAL
Qty: 20 TABLET | Refills: 0 | Status: SHIPPED | OUTPATIENT
Start: 2022-10-04

## 2022-10-04 RX ORDER — HYDROXYZINE PAMOATE 25 MG/1
25 CAPSULE ORAL
Qty: 30 CAPSULE | Refills: 0 | Status: SHIPPED | OUTPATIENT
Start: 2022-10-04 | End: 2022-10-18

## 2022-10-04 RX ORDER — CETIRIZINE HCL 10 MG
10 TABLET ORAL DAILY
Qty: 30 TABLET | Refills: 1 | Status: SHIPPED | OUTPATIENT
Start: 2022-10-04

## 2022-10-04 NOTE — PROGRESS NOTES
Saige Nino (: 1975) is a 51 y.o. female is here for evaluation of the following chief complaint(s): Concussion (Resulting from a fall on 2022 - hit back of head)        Subjective/Objective:   Luciano Florence was seen today for Concussion (Resulting from a fall on 2022 - hit back of head)  Pt states on  was at [employer] and slipped trying to get something from freezer. Pt reports hit head and \"saw stars\". Pt denies loc. Pt was seen the following am in the ER due to HA and body aches. CT of head chest, C spine, and abdomen negative. As were x rays of left ankle and foot and right shoulder and forearm. Pt represented to the ER 2022 due to continued HA. CT head again was neg. Pt dx with post concussion syndrome. Today patient states still has headache and dizziness. Pt has taken only one meclizine. Pt reports Fioricet not working either. Refilled Fioricet and prescribed vistaril to help dizziness. Review of Systems   Constitutional: Negative. HENT: Negative. Eyes: Negative. Respiratory: Negative. Cardiovascular: Negative. Gastrointestinal: Negative. Genitourinary: Negative. Musculoskeletal: Negative. Skin: Negative. Neurological:  Positive for dizziness and headaches. Endo/Heme/Allergies: Negative. Psychiatric/Behavioral: Negative. Physical Exam  Vitals reviewed. Constitutional:       General: She is not in acute distress. Appearance: She is obese. She is not ill-appearing or toxic-appearing. HENT:      Head: Normocephalic and atraumatic. Comments: Area of pain that radiates down side of neck. Right Ear: External ear normal.      Left Ear: External ear normal.      Nose: Nose normal.      Mouth/Throat:      Mouth: Mucous membranes are moist.   Eyes:      Extraocular Movements: Extraocular movements intact. Conjunctiva/sclera: Conjunctivae normal.      Pupils: Pupils are equal, round, and reactive to light.    Neck: Trachea: Trachea and phonation normal.   Cardiovascular:      Rate and Rhythm: Normal rate and regular rhythm. Pulses: Normal pulses. Heart sounds: Normal heart sounds, S1 normal and S2 normal.   Pulmonary:      Effort: Pulmonary effort is normal.      Breath sounds: Normal breath sounds and air entry. Abdominal:      Palpations: Abdomen is soft. Musculoskeletal:      Cervical back: Spasms and tenderness present. Pain with movement and muscular tenderness present. No spinous process tenderness. Thoracic back: Normal.      Lumbar back: Normal.   Skin:     General: Skin is warm and dry. Capillary Refill: Capillary refill takes less than 2 seconds. Findings: No bruising, erythema or lesion. Neurological:      Mental Status: She is alert. Cranial Nerves: Cranial nerves 2-12 are intact. Motor: Motor function is intact. Coordination: Coordination is intact. Gait: Gait is intact. Deep Tendon Reflexes:      Reflex Scores:       Bicep reflexes are 2+ on the right side and 2+ on the left side. Patellar reflexes are 2+ on the right side and 2+ on the left side. Psychiatric:         Mood and Affect: Mood normal.         Behavior: Behavior normal.         Thought Content:  Thought content normal.         Judgment: Judgment normal.        BP (!) 150/87 (BP 1 Location: Left arm, BP Patient Position: Sitting, BP Cuff Size: Large adult)   Pulse 75   Temp 99.6 °F (37.6 °C) (Temporal)   Resp 20   Ht 5' 2\" (1.575 m)   Wt 249 lb (112.9 kg)   SpO2 94%   BMI 45.54 kg/m²      Office Visit on 09/01/2022   Component Date Value Ref Range Status    Cholesterol, total 10/07/2022 125  100 - 199 mg/dL Final    Triglyceride 10/07/2022 87  0 - 149 mg/dL Final    HDL Cholesterol 10/07/2022 29 (A)  >39 mg/dL Final    VLDL, calculated 10/07/2022 17  5 - 40 mg/dL Final    LDL, calculated 10/07/2022 79  0 - 99 mg/dL Final    Glucose 10/07/2022 116 (A)  70 - 99 mg/dL Final **Please note reference interval change**    BUN 10/07/2022 10  6 - 24 mg/dL Final    Creatinine 10/07/2022 1.04 (A)  0.57 - 1.00 mg/dL Final    eGFR 10/07/2022 67  >59 mL/min/1.73 Final    BUN/Creatinine ratio 10/07/2022 10  9 - 23 Final    Sodium 10/07/2022 144  134 - 144 mmol/L Final    Potassium 10/07/2022 4.4  3.5 - 5.2 mmol/L Final    Chloride 10/07/2022 106  96 - 106 mmol/L Final    CO2 10/07/2022 24  20 - 29 mmol/L Final    Calcium 10/07/2022 9.6  8.7 - 10.2 mg/dL Final    Protein, total 10/07/2022 7.5  6.0 - 8.5 g/dL Final    Albumin 10/07/2022 4.2  3.8 - 4.8 g/dL Final    GLOBULIN, TOTAL 10/07/2022 3.3  1.5 - 4.5 g/dL Final    A-G Ratio 10/07/2022 1.3  1.2 - 2.2 Final    Bilirubin, total 10/07/2022 0.2  0.0 - 1.2 mg/dL Final    Alk. phosphatase 10/07/2022 122 (A)  44 - 121 IU/L Final    AST (SGOT) 10/07/2022 15  0 - 40 IU/L Final    ALT (SGPT) 10/07/2022 11  0 - 32 IU/L Final    TSH 10/07/2022 0.977  0.450 - 4.500 uIU/mL Final    Hemoglobin A1c 10/07/2022 6.1 (A)  4.8 - 5.6 % Final    Comment:          Prediabetes: 5.7 - 6.4           Diabetes: >6.4           Glycemic control for adults with diabetes: <7.0      Estimated average glucose 10/07/2022 128  mg/dL Final    WBC 10/07/2022 8.0  3.4 - 10.8 x10E3/uL Final    RBC 10/07/2022 3.60 (A)  3.77 - 5.28 x10E6/uL Final    HGB 10/07/2022 11.5  11.1 - 15.9 g/dL Final    HCT 10/07/2022 34.9  34.0 - 46.6 % Final    MCV 10/07/2022 97  79 - 97 fL Final    MCH 10/07/2022 31.9  26.6 - 33.0 pg Final    MCHC 10/07/2022 33.0  31.5 - 35.7 g/dL Final    RDW 10/07/2022 13.0  11.7 - 15.4 % Final    PLATELET 12/91/3729 806  150 - 450 x10E3/uL Final    NEUTROPHILS 10/07/2022 53  Not Estab. % Final    Lymphocytes 10/07/2022 34  Not Estab. % Final    MONOCYTES 10/07/2022 10  Not Estab. % Final    EOSINOPHILS 10/07/2022 2  Not Estab. % Final    BASOPHILS 10/07/2022 1  Not Estab. % Final    ABS.  NEUTROPHILS 10/07/2022 4.3  1.4 - 7.0 x10E3/uL Final    Abs Lymphocytes 10/07/2022 2.7  0.7 - 3.1 x10E3/uL Final    ABS. MONOCYTES 10/07/2022 0.8  0.1 - 0.9 x10E3/uL Final    ABS. EOSINOPHILS 10/07/2022 0.2  0.0 - 0.4 x10E3/uL Final    ABS. BASOPHILS 10/07/2022 0.0  0.0 - 0.2 x10E3/uL Final    IMMATURE GRANULOCYTES 10/07/2022 0  Not Estab. % Final    ABS. IMM. GRANS. 10/07/2022 0.0  0.0 - 0.1 x10E3/uL Final    HCV Ab 10/07/2022 <0.1  0.0 - 0.9 s/co ratio Final    Specific Gravity 10/07/2022      >=1.030 (A)  1.005 - 1.030 Final    pH (UA) 10/07/2022 5.5  5.0 - 7.5 Final    Color 10/07/2022 Yellow  Yellow Final    Appearance 10/07/2022 Clear  Clear Final    Leukocyte Esterase 10/07/2022 Negative  Negative Final    Protein 10/07/2022 2+ (A)  Negative/Trace Final    Glucose 10/07/2022 Negative  Negative Final    Ketone 10/07/2022 Trace (A)  Negative Final    Blood 10/07/2022 Negative  Negative Final    Bilirubin 10/07/2022 Negative  Negative Final    Urobilinogen 10/07/2022 1.0  0.2 - 1.0 mg/dL Final    Nitrites 10/07/2022 Negative  Negative Final    Microscopic Examination 10/07/2022 See additional order   Final    Microscopic was indicated and was performed. WBC 10/07/2022 0-5  0 - 5 /hpf Final    RBC 10/07/2022 None seen  0 - 2 /hpf Final    Epithelial cells 10/07/2022 0-10  0 - 10 /hpf Final    Casts 10/07/2022 None seen  None seen /lpf Final    Bacteria 10/07/2022 Few  None seen/Few Final    HCV Interpretation 10/07/2022 Comment   Final    Comment: Negative  Not infected with HCV, unless recent infection is suspected or other  evidence exists to indicate HCV infection. INTERPRETATION 10/07/2022 Note   Final    Supplemental report is available.          Past Surgical History:   Procedure Laterality Date    HX BREAST REDUCTION  2004    HX CHOLECYSTECTOMY      AGe 24    HX GYN      hysterectomy/2008    HX GYN  2005 and 2006    Tubal pregnacy    [place] ABDOMEN SURGERY PROC UNLISTED      hernia/4 years ago        Past Medical History:   Diagnosis Date    Chronic back pain     x 1 year/heavy lifting    Endocrine disease     Hypothyroid    Headache(784.0)     Hypercholesterolemia     Hypertension     2 years ago    Prediabetes         Current Outpatient Medications   Medication Instructions    amLODIPine (NORVASC) 10 mg, Oral, DAILY    atorvastatin (LIPITOR) 40 mg, Oral, DAILY    buprenorphine-naloxone 8-2 mg subl 1 Tablet, SubLINGual, 2 TIMES DAILY    buPROPion (WELLBUTRIN) 100 mg, Oral, 3 TIMES DAILY, Start 1 tablet daily, usually in the evening, every few days increase as tolerated, quit    butalbital-acetaminophen-caffeine (FIORICET, ESGIC) -40 mg per tablet 1 Tablet, Oral, EVERY 6 HOURS AS NEEDED    cetirizine (ZYRTEC) 10 mg, Oral, DAILY    hydrOXYzine pamoate (VISTARIL) 25 mg, Oral, 3 TIMES DAILY AS NEEDED    metoprolol tartrate (LOPRESSOR) 25 mg, Oral, 2 TIMES DAILY        Assessment/Plan:     The above diagnosis is a chronic problem. We discussed expected course, resolution, and complications of diagnosis in detail. I advised her to call back or return to office if symptoms worsen/change/persist.        ICD-10-CM ICD-9-CM    1. Post concussion syndrome  F07.81 310.2       2. Allergy, initial encounter  T78.40XA 995.3 cetirizine (ZYRTEC) 10 mg tablet         Return if symptoms worsen or fail to improve. An electronic signature was used to authenticate this note.   -- Endy Moise NP

## 2022-10-04 NOTE — PATIENT INSTRUCTIONS
Please take the new medication and return for any new or worsening symptoms. Make appt after labs are drawn.      Mei Mcconnell NP

## 2022-10-04 NOTE — PROGRESS NOTES
Chief Complaint   Patient presents with    Concussion     Resulting from a fall on 9/12/2022 - hit back of head

## 2022-10-08 LAB
ALBUMIN SERPL-MCNC: 4.2 G/DL (ref 3.8–4.8)
ALBUMIN/GLOB SERPL: 1.3 {RATIO} (ref 1.2–2.2)
ALP SERPL-CCNC: 122 IU/L (ref 44–121)
ALT SERPL-CCNC: 11 IU/L (ref 0–32)
APPEARANCE UR: CLEAR
AST SERPL-CCNC: 15 IU/L (ref 0–40)
BACTERIA #/AREA URNS HPF: NORMAL /[HPF]
BASOPHILS # BLD AUTO: 0 X10E3/UL (ref 0–0.2)
BASOPHILS NFR BLD AUTO: 1 %
BILIRUB SERPL-MCNC: 0.2 MG/DL (ref 0–1.2)
BILIRUB UR QL STRIP: NEGATIVE
BUN SERPL-MCNC: 10 MG/DL (ref 6–24)
BUN/CREAT SERPL: 10 (ref 9–23)
CALCIUM SERPL-MCNC: 9.6 MG/DL (ref 8.7–10.2)
CASTS URNS QL MICRO: NORMAL /LPF
CHLORIDE SERPL-SCNC: 106 MMOL/L (ref 96–106)
CHOLEST SERPL-MCNC: 125 MG/DL (ref 100–199)
CO2 SERPL-SCNC: 24 MMOL/L (ref 20–29)
COLOR UR: YELLOW
CREAT SERPL-MCNC: 1.04 MG/DL (ref 0.57–1)
EGFR: 67 ML/MIN/1.73
EOSINOPHIL # BLD AUTO: 0.2 X10E3/UL (ref 0–0.4)
EOSINOPHIL NFR BLD AUTO: 2 %
EPI CELLS #/AREA URNS HPF: NORMAL /HPF (ref 0–10)
ERYTHROCYTE [DISTWIDTH] IN BLOOD BY AUTOMATED COUNT: 13 % (ref 11.7–15.4)
EST. AVERAGE GLUCOSE BLD GHB EST-MCNC: 128 MG/DL
GLOBULIN SER CALC-MCNC: 3.3 G/DL (ref 1.5–4.5)
GLUCOSE SERPL-MCNC: 116 MG/DL (ref 70–99)
GLUCOSE UR QL STRIP: NEGATIVE
HBA1C MFR BLD: 6.1 % (ref 4.8–5.6)
HCT VFR BLD AUTO: 34.9 % (ref 34–46.6)
HCV AB S/CO SERPL IA: <0.1 S/CO RATIO (ref 0–0.9)
HCV AB SERPL QL IA: NORMAL
HDLC SERPL-MCNC: 29 MG/DL
HGB BLD-MCNC: 11.5 G/DL (ref 11.1–15.9)
HGB UR QL STRIP: NEGATIVE
IMM GRANULOCYTES # BLD AUTO: 0 X10E3/UL (ref 0–0.1)
IMM GRANULOCYTES NFR BLD AUTO: 0 %
IMP & REVIEW OF LAB RESULTS: NORMAL
KETONES UR QL STRIP: ABNORMAL
LDLC SERPL CALC-MCNC: 79 MG/DL (ref 0–99)
LEUKOCYTE ESTERASE UR QL STRIP: NEGATIVE
LYMPHOCYTES # BLD AUTO: 2.7 X10E3/UL (ref 0.7–3.1)
LYMPHOCYTES NFR BLD AUTO: 34 %
MCH RBC QN AUTO: 31.9 PG (ref 26.6–33)
MCHC RBC AUTO-ENTMCNC: 33 G/DL (ref 31.5–35.7)
MCV RBC AUTO: 97 FL (ref 79–97)
MICRO URNS: ABNORMAL
MONOCYTES # BLD AUTO: 0.8 X10E3/UL (ref 0.1–0.9)
MONOCYTES NFR BLD AUTO: 10 %
NEUTROPHILS # BLD AUTO: 4.3 X10E3/UL (ref 1.4–7)
NEUTROPHILS NFR BLD AUTO: 53 %
NITRITE UR QL STRIP: NEGATIVE
PH UR STRIP: 5.5 [PH] (ref 5–7.5)
PLATELET # BLD AUTO: 308 X10E3/UL (ref 150–450)
POTASSIUM SERPL-SCNC: 4.4 MMOL/L (ref 3.5–5.2)
PROT SERPL-MCNC: 7.5 G/DL (ref 6–8.5)
PROT UR QL STRIP: ABNORMAL
RBC # BLD AUTO: 3.6 X10E6/UL (ref 3.77–5.28)
RBC #/AREA URNS HPF: NORMAL /HPF (ref 0–2)
SODIUM SERPL-SCNC: 144 MMOL/L (ref 134–144)
SP GR UR STRIP: >=1.03 (ref 1–1.03)
TRIGL SERPL-MCNC: 87 MG/DL (ref 0–149)
TSH SERPL DL<=0.005 MIU/L-ACNC: 0.98 UIU/ML (ref 0.45–4.5)
UROBILINOGEN UR STRIP-MCNC: 1 MG/DL (ref 0.2–1)
VLDLC SERPL CALC-MCNC: 17 MG/DL (ref 5–40)
WBC # BLD AUTO: 8 X10E3/UL (ref 3.4–10.8)
WBC #/AREA URNS HPF: NORMAL /HPF (ref 0–5)

## 2022-12-19 DIAGNOSIS — I10 ESSENTIAL HYPERTENSION: ICD-10-CM

## 2022-12-19 RX ORDER — AMLODIPINE BESYLATE 10 MG/1
10 TABLET ORAL DAILY
Qty: 90 TABLET | Refills: 0 | Status: SHIPPED | OUTPATIENT
Start: 2022-12-19

## 2023-01-04 ENCOUNTER — OFFICE VISIT (OUTPATIENT)
Dept: INTERNAL MEDICINE CLINIC | Age: 48
End: 2023-01-04
Payer: MEDICAID

## 2023-01-04 VITALS
SYSTOLIC BLOOD PRESSURE: 130 MMHG | RESPIRATION RATE: 20 BRPM | OXYGEN SATURATION: 95 % | DIASTOLIC BLOOD PRESSURE: 85 MMHG | TEMPERATURE: 98.4 F | HEIGHT: 62 IN | BODY MASS INDEX: 44.16 KG/M2 | HEART RATE: 60 BPM | WEIGHT: 240 LBS

## 2023-01-04 DIAGNOSIS — H61.22 IMPACTED CERUMEN OF LEFT EAR: Primary | ICD-10-CM

## 2023-01-04 PROCEDURE — 99212 OFFICE O/P EST SF 10 MIN: CPT | Performed by: NURSE PRACTITIONER

## 2023-01-04 NOTE — PROGRESS NOTES
Chief Complaint   Patient presents with    Ear Fullness     Popping and fullness in left ear X 3 days

## 2023-01-04 NOTE — PROGRESS NOTES
Miley Mchugh (: 1975) is a 49 y.o. female is here for evaluation of the following chief complaint(s): Ear Fullness (Popping and fullness in left ear X 3 days)      Subjective/Objective:   Laurie Dennis was seen today for Ear Fullness (Popping and fullness in left ear X 3 days)  Pt denies blood drainage or injury. Review of Systems   Constitutional: Negative. HENT:  Positive for ear pain and hearing loss. Negative for congestion, ear discharge, nosebleeds, sinus pain and sore throat. Eyes: Negative. Respiratory: Negative. Negative for stridor. Cardiovascular: Negative. Gastrointestinal: Negative. Genitourinary: Negative. Musculoskeletal: Negative. Skin: Negative. Neurological: Negative. Endo/Heme/Allergies: Negative. Psychiatric/Behavioral: Negative. Physical Exam  Vitals reviewed. Constitutional:       General: She is not in acute distress. Appearance: Normal appearance. She is obese. She is not ill-appearing. HENT:      Head: Normocephalic and atraumatic. Left Ear: External ear normal. Decreased hearing noted. Tenderness present. No laceration, drainage or swelling. No middle ear effusion. There is impacted cerumen. No foreign body. No mastoid tenderness. No PE tube. Ears:      Comments: Right ear with cerumen but can see past it and pearly grey TM noted. Left ear which is the one bothering her has impacted cerumen and do not need to use scope to see this. Nose: Nose normal.      Mouth/Throat:      Mouth: Mucous membranes are moist.      Pharynx: Oropharynx is clear. Eyes:      Conjunctiva/sclera: Conjunctivae normal.   Cardiovascular:      Rate and Rhythm: Normal rate and regular rhythm. Pulses: Normal pulses. Heart sounds: Normal heart sounds. Pulmonary:      Effort: Pulmonary effort is normal.      Breath sounds: Normal breath sounds. Abdominal:      Palpations: Abdomen is soft.    Musculoskeletal:         General: Normal range of motion. Cervical back: Normal range of motion. Lymphadenopathy:      Cervical: No cervical adenopathy. Skin:     General: Skin is warm and dry. Capillary Refill: Capillary refill takes less than 2 seconds. Neurological:      General: No focal deficit present. Mental Status: She is alert and oriented to person, place, and time. Psychiatric:         Mood and Affect: Mood normal.         Behavior: Behavior normal.         Thought Content: Thought content normal.         Judgment: Judgment normal.        /85 (BP 1 Location: Left arm, BP Patient Position: Sitting, BP Cuff Size: Large adult)   Pulse 60   Temp 98.4 °F (36.9 °C) (Temporal)   Resp 20   Ht 5' 2\" (1.575 m)   Wt 240 lb (108.9 kg)   SpO2 95%   BMI 43.90 kg/m²      Office Visit on 09/01/2022   Component Date Value Ref Range Status    Cholesterol, total 10/07/2022 125  100 - 199 mg/dL Final    Triglyceride 10/07/2022 87  0 - 149 mg/dL Final    HDL Cholesterol 10/07/2022 29 (A)  >39 mg/dL Final    VLDL, calculated 10/07/2022 17  5 - 40 mg/dL Final    LDL, calculated 10/07/2022 79  0 - 99 mg/dL Final    Glucose 10/07/2022 116 (A)  70 - 99 mg/dL Final                  **Please note reference interval change**    BUN 10/07/2022 10  6 - 24 mg/dL Final    Creatinine 10/07/2022 1.04 (A)  0.57 - 1.00 mg/dL Final    eGFR 10/07/2022 67  >59 mL/min/1.73 Final    BUN/Creatinine ratio 10/07/2022 10  9 - 23 Final    Sodium 10/07/2022 144  134 - 144 mmol/L Final    Potassium 10/07/2022 4.4  3.5 - 5.2 mmol/L Final    Chloride 10/07/2022 106  96 - 106 mmol/L Final    CO2 10/07/2022 24  20 - 29 mmol/L Final    Calcium 10/07/2022 9.6  8.7 - 10.2 mg/dL Final    Protein, total 10/07/2022 7.5  6.0 - 8.5 g/dL Final    Albumin 10/07/2022 4.2  3.8 - 4.8 g/dL Final    GLOBULIN, TOTAL 10/07/2022 3.3  1.5 - 4.5 g/dL Final    A-G Ratio 10/07/2022 1.3  1.2 - 2.2 Final    Bilirubin, total 10/07/2022 0.2  0.0 - 1.2 mg/dL Final    Alk. phosphatase 10/07/2022 122 (A)  44 - 121 IU/L Final    AST (SGOT) 10/07/2022 15  0 - 40 IU/L Final    ALT (SGPT) 10/07/2022 11  0 - 32 IU/L Final    TSH 10/07/2022 0.977  0.450 - 4.500 uIU/mL Final    Hemoglobin A1c 10/07/2022 6.1 (A)  4.8 - 5.6 % Final    Comment:          Prediabetes: 5.7 - 6.4           Diabetes: >6.4           Glycemic control for adults with diabetes: <7.0      Estimated average glucose 10/07/2022 128  mg/dL Final    WBC 10/07/2022 8.0  3.4 - 10.8 x10E3/uL Final    RBC 10/07/2022 3.60 (A)  3.77 - 5.28 x10E6/uL Final    HGB 10/07/2022 11.5  11.1 - 15.9 g/dL Final    HCT 10/07/2022 34.9  34.0 - 46.6 % Final    MCV 10/07/2022 97  79 - 97 fL Final    MCH 10/07/2022 31.9  26.6 - 33.0 pg Final    MCHC 10/07/2022 33.0  31.5 - 35.7 g/dL Final    RDW 10/07/2022 13.0  11.7 - 15.4 % Final    PLATELET 21/30/0400 884  150 - 450 x10E3/uL Final    NEUTROPHILS 10/07/2022 53  Not Estab. % Final    Lymphocytes 10/07/2022 34  Not Estab. % Final    MONOCYTES 10/07/2022 10  Not Estab. % Final    EOSINOPHILS 10/07/2022 2  Not Estab. % Final    BASOPHILS 10/07/2022 1  Not Estab. % Final    ABS. NEUTROPHILS 10/07/2022 4.3  1.4 - 7.0 x10E3/uL Final    Abs Lymphocytes 10/07/2022 2.7  0.7 - 3.1 x10E3/uL Final    ABS. MONOCYTES 10/07/2022 0.8  0.1 - 0.9 x10E3/uL Final    ABS. EOSINOPHILS 10/07/2022 0.2  0.0 - 0.4 x10E3/uL Final    ABS. BASOPHILS 10/07/2022 0.0  0.0 - 0.2 x10E3/uL Final    IMMATURE GRANULOCYTES 10/07/2022 0  Not Estab. % Final    ABS. IMM. GRANS.  10/07/2022 0.0  0.0 - 0.1 x10E3/uL Final    HCV Ab 10/07/2022 <0.1  0.0 - 0.9 s/co ratio Final    Specific Gravity 10/07/2022      >=1.030 (A)  1.005 - 1.030 Final    pH (UA) 10/07/2022 5.5  5.0 - 7.5 Final    Color 10/07/2022 Yellow  Yellow Final    Appearance 10/07/2022 Clear  Clear Final    Leukocyte Esterase 10/07/2022 Negative  Negative Final    Protein 10/07/2022 2+ (A)  Negative/Trace Final    Glucose 10/07/2022 Negative  Negative Final    Ketone 10/07/2022 Trace (A)  Negative Final    Blood 10/07/2022 Negative  Negative Final    Bilirubin 10/07/2022 Negative  Negative Final    Urobilinogen 10/07/2022 1.0  0.2 - 1.0 mg/dL Final    Nitrites 10/07/2022 Negative  Negative Final    Microscopic Examination 10/07/2022 See additional order   Final    Microscopic was indicated and was performed. WBC 10/07/2022 0-5  0 - 5 /hpf Final    RBC 10/07/2022 None seen  0 - 2 /hpf Final    Epithelial cells 10/07/2022 0-10  0 - 10 /hpf Final    Casts 10/07/2022 None seen  None seen /lpf Final    Bacteria 10/07/2022 Few  None seen/Few Final    HCV Interpretation 10/07/2022 Comment   Final    Comment: Negative  Not infected with HCV, unless recent infection is suspected or other  evidence exists to indicate HCV infection. INTERPRETATION 10/07/2022 Note   Final    Supplemental report is available.          Past Surgical History:   Procedure Laterality Date    HX BREAST REDUCTION  2004    HX CHOLECYSTECTOMY      AGe 25    HX GYN      hysterectomy/2008    HX GYN  2005 and 2006    Tubal pregnacy    MA UNLISTED PROCEDURE ABDOMEN PERITONEUM & OMENTUM      hernia/4 years ago        Past Medical History:   Diagnosis Date    Chronic back pain     x 1 year/heavy lifting    Endocrine disease     Hypothyroid    Headache(784.0)     Hypercholesterolemia     Hypertension     2 years ago    Prediabetes         Current Outpatient Medications   Medication Instructions    amLODIPine (NORVASC) 10 mg, Oral, DAILY    atorvastatin (LIPITOR) 40 mg, Oral, DAILY    buprenorphine-naloxone 8-2 mg subl 1 Tablet, SubLINGual, 2 TIMES DAILY    buPROPion (WELLBUTRIN) 100 mg, Oral, 3 TIMES DAILY, Start 1 tablet daily, usually in the evening, every few days increase as tolerated, quit    butalbital-acetaminophen-caffeine (FIORICET, ESGIC) -40 mg per tablet 1 Tablet, Oral, EVERY 6 HOURS AS NEEDED    cetirizine (ZYRTEC) 10 mg, Oral, DAILY    metoprolol tartrate (LOPRESSOR) 25 mg, Oral, 2 TIMES DAILY Assessment/Plan:     The above diagnosis is a new problem. We discussed expected course, resolution, and complications of diagnosis in detail. I advised her to call back or return to office if symptoms worsen/change/persist.        ICD-10-CM ICD-9-CM    1. Impacted cerumen of left ear  H61.22 380.4          Return if symptoms worsen or fail to improve. An electronic signature was used to authenticate this note.   -- Annita Galvez NP

## 2023-03-23 DIAGNOSIS — I10 ESSENTIAL HYPERTENSION: ICD-10-CM

## 2023-03-24 RX ORDER — AMLODIPINE BESYLATE 10 MG/1
10 TABLET ORAL DAILY
Qty: 90 TABLET | Refills: 0 | OUTPATIENT
Start: 2023-03-24

## 2023-03-27 RX ORDER — ATORVASTATIN CALCIUM 40 MG/1
40 TABLET, FILM COATED ORAL DAILY
Qty: 90 TABLET | Refills: 0 | Status: SHIPPED | OUTPATIENT
Start: 2023-03-27

## 2023-03-27 RX ORDER — METOPROLOL TARTRATE 25 MG/1
TABLET, FILM COATED ORAL
Qty: 180 TABLET | Refills: 0 | Status: SHIPPED | OUTPATIENT
Start: 2023-03-27

## 2023-04-03 RX ORDER — AMLODIPINE BESYLATE 10 MG/1
10 TABLET ORAL DAILY
Qty: 90 TABLET | Refills: 0 | Status: SHIPPED | OUTPATIENT
Start: 2023-04-03

## 2023-07-31 RX ORDER — ATORVASTATIN CALCIUM 40 MG/1
TABLET, FILM COATED ORAL
Qty: 90 TABLET | Refills: 0 | Status: SHIPPED | OUTPATIENT
Start: 2023-07-31 | End: 2023-10-29

## 2023-07-31 RX ORDER — CETIRIZINE HYDROCHLORIDE 10 MG/1
TABLET ORAL
Qty: 30 TABLET | Refills: 0 | Status: SHIPPED | OUTPATIENT
Start: 2023-07-31

## 2023-07-31 RX ORDER — AMLODIPINE BESYLATE 10 MG/1
TABLET ORAL
Qty: 90 TABLET | Refills: 0 | Status: SHIPPED | OUTPATIENT
Start: 2023-07-31 | End: 2023-10-29

## 2023-11-20 RX ORDER — ATORVASTATIN CALCIUM 40 MG/1
40 TABLET, FILM COATED ORAL DAILY
Qty: 90 TABLET | Refills: 0 | OUTPATIENT
Start: 2023-11-20

## 2023-11-20 RX ORDER — AMLODIPINE BESYLATE 10 MG/1
TABLET ORAL
Qty: 90 TABLET | Refills: 0 | OUTPATIENT
Start: 2023-11-20 | End: 2024-02-18

## 2023-11-28 RX ORDER — ATORVASTATIN CALCIUM 40 MG/1
TABLET, FILM COATED ORAL
Qty: 90 TABLET | Refills: 0 | OUTPATIENT
Start: 2023-11-28 | End: 2024-02-26

## 2023-11-28 NOTE — TELEPHONE ENCOUNTER
Pt calling to get refills called in to [facility].   Atorvastatin 40mg  Amlodipine 10mg  Metoprolol 25mg  Cetirizine 10mg  She can be reached at [phone]

## 2023-11-29 RX ORDER — AMLODIPINE BESYLATE 10 MG/1
10 TABLET ORAL DAILY
Qty: 90 TABLET | Refills: 0 | OUTPATIENT
Start: 2023-11-29 | End: 2024-02-27

## 2023-11-29 RX ORDER — ATORVASTATIN CALCIUM 40 MG/1
40 TABLET, FILM COATED ORAL DAILY
Qty: 90 TABLET | Refills: 0 | OUTPATIENT
Start: 2023-11-29 | End: 2024-02-27

## 2023-11-29 RX ORDER — CETIRIZINE HYDROCHLORIDE 10 MG/1
TABLET ORAL
Qty: 90 TABLET | Refills: 0 | OUTPATIENT
Start: 2023-11-29

## 2024-01-02 RX ORDER — AMLODIPINE BESYLATE 10 MG/1
TABLET ORAL
Qty: 90 TABLET | Refills: 0 | Status: SHIPPED | OUTPATIENT
Start: 2024-01-02 | End: 2024-04-01

## 2024-03-21 RX ORDER — AMLODIPINE BESYLATE 10 MG/1
TABLET ORAL
Qty: 90 TABLET | Refills: 0 | OUTPATIENT
Start: 2024-03-21 | End: 2024-06-19

## 2024-04-16 RX ORDER — AMLODIPINE BESYLATE 10 MG/1
TABLET ORAL
Qty: 90 TABLET | Refills: 0 | OUTPATIENT
Start: 2024-04-16 | End: 2024-07-15

## 2024-04-16 RX ORDER — CETIRIZINE HYDROCHLORIDE 10 MG/1
TABLET ORAL
Qty: 30 TABLET | Refills: 0 | OUTPATIENT
Start: 2024-04-16

## 2024-04-25 RX ORDER — CETIRIZINE HYDROCHLORIDE 10 MG/1
TABLET ORAL
Qty: 30 TABLET | Refills: 0 | Status: SHIPPED | OUTPATIENT
Start: 2024-04-25

## 2024-04-25 RX ORDER — AMLODIPINE BESYLATE 10 MG/1
10 TABLET ORAL DAILY
Qty: 30 TABLET | Refills: 0 | Status: SHIPPED | OUTPATIENT
Start: 2024-04-25 | End: 2024-07-24

## 2024-05-09 ENCOUNTER — OFFICE VISIT (OUTPATIENT)
Facility: CLINIC | Age: 49
End: 2024-05-09

## 2024-05-09 VITALS
HEART RATE: 55 BPM | SYSTOLIC BLOOD PRESSURE: 138 MMHG | RESPIRATION RATE: 16 BRPM | WEIGHT: 247 LBS | DIASTOLIC BLOOD PRESSURE: 80 MMHG | OXYGEN SATURATION: 97 % | HEIGHT: 62 IN | TEMPERATURE: 97.4 F | BODY MASS INDEX: 45.45 KG/M2

## 2024-05-09 DIAGNOSIS — Z00.00 ANNUAL PHYSICAL EXAM: ICD-10-CM

## 2024-05-09 DIAGNOSIS — R73.9 HYPERGLYCEMIA: ICD-10-CM

## 2024-05-09 DIAGNOSIS — I10 ESSENTIAL (PRIMARY) HYPERTENSION: ICD-10-CM

## 2024-05-09 DIAGNOSIS — Z12.31 BREAST CANCER SCREENING BY MAMMOGRAM: ICD-10-CM

## 2024-05-09 DIAGNOSIS — E78.5 HYPERLIPIDEMIA, UNSPECIFIED HYPERLIPIDEMIA TYPE: ICD-10-CM

## 2024-05-09 DIAGNOSIS — Z12.11 COLON CANCER SCREENING: ICD-10-CM

## 2024-05-09 DIAGNOSIS — B37.9 CANDIDA INFECTION: Primary | ICD-10-CM

## 2024-05-09 RX ORDER — AMLODIPINE BESYLATE 10 MG/1
10 TABLET ORAL DAILY
Qty: 30 TABLET | Refills: 0 | Status: SHIPPED | OUTPATIENT
Start: 2024-05-09 | End: 2024-08-07

## 2024-05-09 RX ORDER — ATORVASTATIN CALCIUM 40 MG/1
40 TABLET, FILM COATED ORAL DAILY
Qty: 90 TABLET | Refills: 0 | Status: SHIPPED | OUTPATIENT
Start: 2024-05-09 | End: 2024-08-07

## 2024-05-09 RX ORDER — NYSTATIN 100000 U/G
CREAM TOPICAL
Qty: 3 EACH | Refills: 1 | Status: SHIPPED | OUTPATIENT
Start: 2024-05-09

## 2024-05-09 RX ORDER — CETIRIZINE HYDROCHLORIDE 10 MG/1
TABLET ORAL
Qty: 30 TABLET | Refills: 0 | Status: SHIPPED | OUTPATIENT
Start: 2024-05-09

## 2024-05-09 NOTE — PROGRESS NOTES
Chief Complaint   Patient presents with    Blood Pressure Check     Refill meds    Eczema     Eczema rash under stomach

## 2024-05-09 NOTE — PATIENT INSTRUCTIONS
It was a pleasure to see you today.    1. Candida infection    2. Body mass index (BMI) 40.0-44.9, adult (HCC)    3. Essential (primary) hypertension    4. Hyperlipidemia, unspecified hyperlipidemia type    5. Annual physical exam    6. Colon cancer screening    7. Breast cancer screening by mammogram    8. Hyperglycemia         No follow-ups on file.     Thank you for choosing [facility].    OLIVIA Lala - REX

## 2024-05-16 ENCOUNTER — HOSPITAL ENCOUNTER (OUTPATIENT)
Facility: HOSPITAL | Age: 49
Discharge: HOME | End: 2024-05-16
Payer: MEDICAID

## 2024-05-16 DIAGNOSIS — Z12.31 BREAST CANCER SCREENING BY MAMMOGRAM: ICD-10-CM

## 2024-05-16 PROCEDURE — 77067 SCR MAMMO BI INCL CAD: CPT

## 2024-05-16 PROCEDURE — 77063 BREAST TOMOSYNTHESIS BI: CPT

## 2024-05-18 LAB
ALBUMIN SERPL-MCNC: 4.2 G/DL (ref 3.9–4.9)
ALBUMIN/GLOB SERPL: 1.4 {RATIO} (ref 1.2–2.2)
ALP SERPL-CCNC: 132 IU/L (ref 44–121)
ALT SERPL-CCNC: 20 IU/L (ref 0–32)
APPEARANCE UR: CLEAR
AST SERPL-CCNC: 25 IU/L (ref 0–40)
BASOPHILS # BLD AUTO: 0.1 X10E3/UL (ref 0–0.2)
BASOPHILS NFR BLD AUTO: 1 %
BILIRUB SERPL-MCNC: 0.2 MG/DL (ref 0–1.2)
BILIRUB UR QL STRIP: NEGATIVE
BUN SERPL-MCNC: 12 MG/DL (ref 6–24)
BUN/CREAT SERPL: 14 (ref 9–23)
CALCIUM SERPL-MCNC: 9.2 MG/DL (ref 8.7–10.2)
CHLORIDE SERPL-SCNC: 105 MMOL/L (ref 96–106)
CHOLEST SERPL-MCNC: 126 MG/DL (ref 100–199)
CO2 SERPL-SCNC: 25 MMOL/L (ref 20–29)
COLOR UR: YELLOW
CREAT SERPL-MCNC: 0.83 MG/DL (ref 0.57–1)
EGFRCR SERPLBLD CKD-EPI 2021: 86 ML/MIN/1.73
EOSINOPHIL # BLD AUTO: 0.3 X10E3/UL (ref 0–0.4)
EOSINOPHIL NFR BLD AUTO: 3 %
ERYTHROCYTE [DISTWIDTH] IN BLOOD BY AUTOMATED COUNT: 13.1 % (ref 11.7–15.4)
GLOBULIN SER CALC-MCNC: 2.9 G/DL (ref 1.5–4.5)
GLUCOSE SERPL-MCNC: 111 MG/DL (ref 70–99)
GLUCOSE UR QL STRIP: NEGATIVE
HBA1C MFR BLD: 6.2 % (ref 4.8–5.6)
HCT VFR BLD AUTO: 34.8 % (ref 34–46.6)
HDLC SERPL-MCNC: 34 MG/DL
HGB BLD-MCNC: 11.1 G/DL (ref 11.1–15.9)
HGB UR QL STRIP: NEGATIVE
IMM GRANULOCYTES # BLD AUTO: 0 X10E3/UL (ref 0–0.1)
IMM GRANULOCYTES NFR BLD AUTO: 0 %
IMP & REVIEW OF LAB RESULTS: NORMAL
KETONES UR QL STRIP: NEGATIVE
LDLC SERPL CALC-MCNC: 65 MG/DL (ref 0–99)
LEUKOCYTE ESTERASE UR QL STRIP: NEGATIVE
LYMPHOCYTES # BLD AUTO: 4.3 X10E3/UL (ref 0.7–3.1)
LYMPHOCYTES NFR BLD AUTO: 43 %
MCH RBC QN AUTO: 31.8 PG (ref 26.6–33)
MCHC RBC AUTO-ENTMCNC: 31.9 G/DL (ref 31.5–35.7)
MCV RBC AUTO: 100 FL (ref 79–97)
MICRO URNS: NORMAL
MONOCYTES # BLD AUTO: 1.1 X10E3/UL (ref 0.1–0.9)
MONOCYTES NFR BLD AUTO: 11 %
NEUTROPHILS # BLD AUTO: 4.2 X10E3/UL (ref 1.4–7)
NEUTROPHILS NFR BLD AUTO: 42 %
NITRITE UR QL STRIP: NEGATIVE
PH UR STRIP: 5.5 [PH] (ref 5–7.5)
PLATELET # BLD AUTO: 288 X10E3/UL (ref 150–450)
POTASSIUM SERPL-SCNC: 4.1 MMOL/L (ref 3.5–5.2)
PROT SERPL-MCNC: 7.1 G/DL (ref 6–8.5)
PROT UR QL STRIP: NORMAL
RBC # BLD AUTO: 3.49 X10E6/UL (ref 3.77–5.28)
SODIUM SERPL-SCNC: 142 MMOL/L (ref 134–144)
SP GR UR STRIP: 1.02 (ref 1–1.03)
TRIGL SERPL-MCNC: 159 MG/DL (ref 0–149)
UROBILINOGEN UR STRIP-MCNC: 0.2 MG/DL (ref 0.2–1)
VLDLC SERPL CALC-MCNC: 27 MG/DL (ref 5–40)
WBC # BLD AUTO: 10 X10E3/UL (ref 3.4–10.8)

## 2024-05-26 LAB
T4 FREE SERPL DIALY-MCNC: 1.1 NG/DL
TSH SERPL-ACNC: 2.3 UU/ML

## 2024-05-29 RX ORDER — ATORVASTATIN CALCIUM 40 MG/1
40 TABLET, FILM COATED ORAL DAILY
Qty: 90 TABLET | Refills: 1 | Status: SHIPPED | OUTPATIENT
Start: 2024-05-29 | End: 2024-11-25

## 2024-05-30 ENCOUNTER — CLINICAL DOCUMENTATION (OUTPATIENT)
Facility: CLINIC | Age: 49
End: 2024-05-30

## 2024-05-30 NOTE — PROGRESS NOTES
Chief Complaint   Patient presents with    colonoscopy scheduled     Notice received from [facility] colonoscopy is scheduled with Dr Smith on 10/10/24 at [facility] CARMINA Cedeno LPN 5/30/2024 10:47 AM

## 2024-06-20 NOTE — TELEPHONE ENCOUNTER
Requested Prescriptions     Pending Prescriptions Disp Refills    amLODIPine (NORVASC) 10 MG tablet [Pharmacy Med Name: AMLODIPINE BESYLATE 10MG TABLETS] 30 tablet 0     Sig: TAKE 1 TABLET BY MOUTH DAILY     Last fill 5/9/2024 for #30 w/ no addtnl  Last OV 5/9/2024  Next OV 11/7/2024    Angela Peña LPN

## 2024-06-24 RX ORDER — AMLODIPINE BESYLATE 10 MG/1
10 TABLET ORAL DAILY
Qty: 30 TABLET | Refills: 0 | Status: SHIPPED | OUTPATIENT
Start: 2024-06-24 | End: 2024-09-22

## 2024-07-22 RX ORDER — AMLODIPINE BESYLATE 10 MG/1
10 TABLET ORAL DAILY
Qty: 90 TABLET | Refills: 1 | Status: SHIPPED | OUTPATIENT
Start: 2024-07-22 | End: 2025-01-18

## 2024-10-01 RX ORDER — METOPROLOL TARTRATE 25 MG/1
TABLET, FILM COATED ORAL
Qty: 180 TABLET | Refills: 1 | Status: SHIPPED | OUTPATIENT
Start: 2024-10-01

## 2024-11-07 ENCOUNTER — OFFICE VISIT (OUTPATIENT)
Facility: CLINIC | Age: 49
End: 2024-11-07
Payer: MEDICAID

## 2024-11-07 VITALS
OXYGEN SATURATION: 95 % | DIASTOLIC BLOOD PRESSURE: 74 MMHG | SYSTOLIC BLOOD PRESSURE: 137 MMHG | WEIGHT: 248 LBS | RESPIRATION RATE: 20 BRPM | TEMPERATURE: 98.2 F | HEIGHT: 62 IN | BODY MASS INDEX: 45.64 KG/M2 | HEART RATE: 56 BPM

## 2024-11-07 DIAGNOSIS — M25.461 PAIN AND SWELLING OF RIGHT KNEE: Primary | ICD-10-CM

## 2024-11-07 DIAGNOSIS — E78.5 HYPERLIPIDEMIA, UNSPECIFIED HYPERLIPIDEMIA TYPE: ICD-10-CM

## 2024-11-07 DIAGNOSIS — M25.561 PAIN AND SWELLING OF RIGHT KNEE: Primary | ICD-10-CM

## 2024-11-07 DIAGNOSIS — Z12.11 COLON CANCER SCREENING: ICD-10-CM

## 2024-11-07 LAB — HBA1C MFR BLD: 5.8 %

## 2024-11-07 PROCEDURE — 3075F SYST BP GE 130 - 139MM HG: CPT | Performed by: NURSE PRACTITIONER

## 2024-11-07 PROCEDURE — 83036 HEMOGLOBIN GLYCOSYLATED A1C: CPT | Performed by: NURSE PRACTITIONER

## 2024-11-07 PROCEDURE — 99213 OFFICE O/P EST LOW 20 MIN: CPT | Performed by: NURSE PRACTITIONER

## 2024-11-07 PROCEDURE — 3078F DIAST BP <80 MM HG: CPT | Performed by: NURSE PRACTITIONER

## 2024-11-07 RX ORDER — AMLODIPINE BESYLATE 10 MG/1
10 TABLET ORAL DAILY
Qty: 90 TABLET | Refills: 1 | Status: SHIPPED | OUTPATIENT
Start: 2024-11-07 | End: 2025-05-06

## 2024-11-07 RX ORDER — NYSTATIN 100000 U/G
CREAM TOPICAL
Qty: 3 EACH | Refills: 1 | Status: SHIPPED | OUTPATIENT
Start: 2024-11-07

## 2024-11-07 RX ORDER — ATORVASTATIN CALCIUM 40 MG/1
40 TABLET, FILM COATED ORAL DAILY
Qty: 90 TABLET | Refills: 1 | Status: SHIPPED | OUTPATIENT
Start: 2024-11-07 | End: 2025-05-06

## 2024-11-07 SDOH — ECONOMIC STABILITY: FOOD INSECURITY: WITHIN THE PAST 12 MONTHS, YOU WORRIED THAT YOUR FOOD WOULD RUN OUT BEFORE YOU GOT MONEY TO BUY MORE.: NEVER TRUE

## 2024-11-07 SDOH — ECONOMIC STABILITY: FOOD INSECURITY: WITHIN THE PAST 12 MONTHS, THE FOOD YOU BOUGHT JUST DIDN'T LAST AND YOU DIDN'T HAVE MONEY TO GET MORE.: NEVER TRUE

## 2024-11-07 SDOH — ECONOMIC STABILITY: INCOME INSECURITY: HOW HARD IS IT FOR YOU TO PAY FOR THE VERY BASICS LIKE FOOD, HOUSING, MEDICAL CARE, AND HEATING?: NOT HARD AT ALL

## 2024-11-07 ASSESSMENT — PATIENT HEALTH QUESTIONNAIRE - PHQ9
1. LITTLE INTEREST OR PLEASURE IN DOING THINGS: NOT AT ALL
2. FEELING DOWN, DEPRESSED OR HOPELESS: NOT AT ALL
SUM OF ALL RESPONSES TO PHQ9 QUESTIONS 1 & 2: 0
SUM OF ALL RESPONSES TO PHQ QUESTIONS 1-9: 0

## 2024-11-07 ASSESSMENT — ENCOUNTER SYMPTOMS
CONSTIPATION: 0
ABDOMINAL PAIN: 0
DIARRHEA: 0
WHEEZING: 0
NAUSEA: 0
SHORTNESS OF BREATH: 0
CHEST TIGHTNESS: 0
COUGH: 0

## 2024-11-07 NOTE — PATIENT INSTRUCTIONS
It was a pleasure to see you today.    1. Pain and swelling of right knee    2. Hyperlipidemia, unspecified hyperlipidemia type    3. Colon cancer screening         No follow-ups on file.     Thank you for choosing [facility].    OLIVIA Lala NP

## 2024-11-07 NOTE — PROGRESS NOTES
Avis Sánchez (:  1975) is a 48 y.o. female who presents to the office today for the following:  Leg Pain (Pain and swelling around right knee - pain behind knee - 6 month follow up)      Assessment & Plan   ASSESSMENT/PLAN:  1. Pain and swelling of right knee  Comments:  and lower leg off and on  Orders:  -     AMB POC HEMOGLOBIN A1C  2. Hyperlipidemia, unspecified hyperlipidemia type  -     Lipid Panel; Future  3. Colon cancer screening  -     Cologuard (Fecal DNA Colorectal Cancer Screening)      Results for orders placed or performed in visit on 24   AMB POC HEMOGLOBIN A1C   Result Value Ref Range    Hemoglobin A1C, POC 5.8 %        Return in about 6 months (around 2025).         Subjective   SUBJECTIVE/OBJECTIVE:  Leg Pain   Pertinent negatives include no numbness.     Pt presents for 6 month follow up regarding her cholesterol level. Will recheck. Pt states trying  to eat better. Pt only new complaint is right knee and lower leg has been swelling off and on for a month. Pt reports pain behind knee. Pt denies any injury. Recommended pt go to the ER for a doppler of leg. A1c 5.8 today    Allergies   Allergen Reactions    Cefazolin Hives    Penicillins Swelling     Current Outpatient Medications   Medication Sig Dispense Refill    nystatin (MYCOSTATIN) 757997 UNIT/GM cream Apply topically 2 times daily. 3 each 1    amLODIPine (NORVASC) 10 MG tablet Take 1 tablet by mouth daily 90 tablet 1    atorvastatin (LIPITOR) 40 MG tablet Take 1 tablet by mouth daily 90 tablet 1    metoprolol tartrate (LOPRESSOR) 25 MG tablet TAKE 1 TABLET BY MOUTH TWO TIMES A DAY. HOLD FOR HR LESS THAN 55 OR SYSTOLIC BLOOD PRESSURE LESS THAN 90 180 tablet 1    cetirizine (ZYRTEC) 10 MG tablet TAKE 1 TABLET BY MOUTH DAILY. INDICATIONS: SEASONAL RUNNY NOSE 30 tablet 0    buprenorphine-naloxone (SUBOXONE) 8-2 MG SUBL SL tablet Place 1 tablet under the tongue 2 times daily.      buPROPion (WELLBUTRIN) 100 MG tablet Take

## 2024-11-21 ENCOUNTER — OFFICE VISIT (OUTPATIENT)
Facility: CLINIC | Age: 49
End: 2024-11-21

## 2024-11-21 VITALS
HEART RATE: 63 BPM | HEIGHT: 62 IN | OXYGEN SATURATION: 96 % | SYSTOLIC BLOOD PRESSURE: 134 MMHG | WEIGHT: 250 LBS | DIASTOLIC BLOOD PRESSURE: 92 MMHG | TEMPERATURE: 98.6 F | BODY MASS INDEX: 46.01 KG/M2 | RESPIRATION RATE: 20 BRPM

## 2024-11-21 DIAGNOSIS — M54.41 CHRONIC MIDLINE LOW BACK PAIN WITH RIGHT-SIDED SCIATICA: ICD-10-CM

## 2024-11-21 DIAGNOSIS — I10 PRIMARY HYPERTENSION: ICD-10-CM

## 2024-11-21 DIAGNOSIS — G89.29 CHRONIC MIDLINE LOW BACK PAIN WITH RIGHT-SIDED SCIATICA: ICD-10-CM

## 2024-11-21 DIAGNOSIS — M25.561 RIGHT KNEE PAIN, UNSPECIFIED CHRONICITY: Primary | ICD-10-CM

## 2024-11-21 RX ORDER — ACETAMINOPHEN 500 MG
500 TABLET ORAL 4 TIMES DAILY PRN
Qty: 360 TABLET | Refills: 1 | Status: SHIPPED | OUTPATIENT
Start: 2024-11-21

## 2024-11-21 RX ORDER — IBUPROFEN 600 MG/1
600 TABLET, FILM COATED ORAL 4 TIMES DAILY PRN
Qty: 40 TABLET | Refills: 0 | Status: SHIPPED | OUTPATIENT
Start: 2024-11-21

## 2024-11-21 ASSESSMENT — ENCOUNTER SYMPTOMS
CONSTIPATION: 0
NAUSEA: 0
DIARRHEA: 0
COUGH: 0
CHEST TIGHTNESS: 0
ABDOMINAL PAIN: 0
WHEEZING: 0
BACK PAIN: 1
SHORTNESS OF BREATH: 0

## 2024-11-21 NOTE — PATIENT INSTRUCTIONS
It was a pleasure to see you today.    1. Right knee pain, unspecified chronicity    2. Chronic midline low back pain with right-sided sciatica    3. Primary hypertension    4. BMI 45.0-49.9, adult       Please get x rays done. Take tylenol and ibuprofen alternating. Please have cholesterol checked. Work on diet and exercise.     Return if symptoms worsen or fail to improve.     Thank you for choosing [facility].  Patient Education        Eating Healthy Foods: Care Instructions  With every meal, you can make healthy food choices. Try to eat a variety of fruits, vegetables, whole grains, lean proteins, and low-fat dairy products. This can help you get the right balance of nutrients, including vitamins and minerals. Small changes add up over time. You can start by adding one healthy food to your meals each day.    Try to make half your plate fruits and vegetables, one-fourth whole grains, and one-fourth lean proteins. Try including dairy with your meals.   Eat more fruits and vegetables. Try to have them with most meals and snacks.   Foods for healthy eating        Fruits   These can be fresh, frozen, canned, or dried.  Try to choose whole fruit rather than fruit juice.  Eat a variety of colors.        Vegetables   These can be fresh, frozen, canned, or dried.  Beans, peas, and lentils count too.        Whole grains   Choose whole-grain breads, cereals, and noodles.  Try brown rice.        Lean proteins   These can include lean meat, poultry, fish, and eggs.  You can also have tofu, beans, peas, lentils, nuts, and seeds.        Dairy   Try milk, yogurt, and cheese.  Choose low-fat or fat-free when you can.  If you need to, use lactose-free milk or fortified plant-based milk products, such as soy milk.        Water   Drink water when you're thirsty.  Limit sugar-sweetened drinks, including soda, fruit drinks, and sports drinks.  Where can you learn more?  Go to

## 2024-11-21 NOTE — PROGRESS NOTES
Chief Complaint   Patient presents with    Leg Pain     Cramping pains in right leg X about 1 month - especially around kneecap and behind knee    Swelling     Swelling in right leg and ankles - burning in toes

## 2024-11-21 NOTE — PROGRESS NOTES
Avis Sánchez (:  1975) is a 48 y.o. female who presents to the office today for the following:  Leg Pain (Cramping pains in right leg X about 1 month - especially around kneecap and behind knee) and Swelling (Swelling in right leg and ankles - burning in toes)      Assessment & Plan   ASSESSMENT/PLAN:  1. Right knee pain, unspecified chronicity  -     XR KNEE RIGHT (1-2 VIEWS); Future  2. Chronic midline low back pain with right-sided sciatica  -     XR LUMBAR SPINE (2-3 VIEWS); Future  3. Primary hypertension  4. BMI 45.0-49.9, adult      No results found for any visits on 24.     Return if symptoms worsen or fail to improve.         Subjective   SUBJECTIVE/OBJECTIVE:  Leg Pain   Pertinent negatives include no numbness.   Swelling  Associated symptoms include joint swelling and myalgias. Pertinent negatives include no abdominal pain, chest pain, coughing, fatigue, fever, headaches, nausea, numbness, rash or weakness.     Pt presents for continued right  knee pain and swelling and low back pain. Pt denies injury. Pt states knee swells gradually throughout day and goes down at night. Pt has chronic low back pain. Pt has numbness between 2-3 toe on right. Will get xray of R knee and low back. Pt counseled to alternate between tylenol and ibuprofen.      Allergies   Allergen Reactions    Cefazolin Hives    Penicillins Swelling     Current Outpatient Medications   Medication Sig Dispense Refill    ibuprofen (ADVIL;MOTRIN) 600 MG tablet Take 1 tablet by mouth 4 times daily as needed for Pain 40 tablet 0    acetaminophen (TYLENOL) 500 MG tablet Take 1 tablet by mouth 4 times daily as needed for Pain 360 tablet 1    nystatin (MYCOSTATIN) 716345 UNIT/GM cream Apply topically 2 times daily. 3 each 1    amLODIPine (NORVASC) 10 MG tablet Take 1 tablet by mouth daily 90 tablet 1    atorvastatin (LIPITOR) 40 MG tablet Take 1 tablet by mouth daily 90 tablet 1    metoprolol tartrate (LOPRESSOR) 25 MG tablet

## 2024-11-22 ENCOUNTER — TELEPHONE (OUTPATIENT)
Facility: CLINIC | Age: 49
End: 2024-11-22

## 2024-12-02 ENCOUNTER — TELEPHONE (OUTPATIENT)
Facility: CLINIC | Age: 49
End: 2024-12-02

## 2024-12-09 RX ORDER — CETIRIZINE HYDROCHLORIDE 10 MG/1
TABLET ORAL
Qty: 90 TABLET | Refills: 0 | Status: SHIPPED | OUTPATIENT
Start: 2024-12-09

## 2025-03-17 RX ORDER — METOPROLOL TARTRATE 25 MG/1
TABLET, FILM COATED ORAL
Qty: 180 TABLET | Refills: 1 | Status: SHIPPED | OUTPATIENT
Start: 2025-03-17

## 2025-05-01 DIAGNOSIS — M25.561 RIGHT KNEE PAIN, UNSPECIFIED CHRONICITY: ICD-10-CM

## 2025-05-01 DIAGNOSIS — G89.29 CHRONIC MIDLINE LOW BACK PAIN WITH RIGHT-SIDED SCIATICA: ICD-10-CM

## 2025-05-01 DIAGNOSIS — M54.41 CHRONIC MIDLINE LOW BACK PAIN WITH RIGHT-SIDED SCIATICA: ICD-10-CM

## 2025-05-01 PROCEDURE — 72100 X-RAY EXAM L-S SPINE 2/3 VWS: CPT | Performed by: NURSE PRACTITIONER

## 2025-05-08 ENCOUNTER — OFFICE VISIT (OUTPATIENT)
Facility: CLINIC | Age: 50
End: 2025-05-08

## 2025-05-08 VITALS
WEIGHT: 254 LBS | OXYGEN SATURATION: 94 % | TEMPERATURE: 98.4 F | SYSTOLIC BLOOD PRESSURE: 125 MMHG | BODY MASS INDEX: 46.74 KG/M2 | HEART RATE: 60 BPM | HEIGHT: 62 IN | RESPIRATION RATE: 16 BRPM | DIASTOLIC BLOOD PRESSURE: 72 MMHG

## 2025-05-08 DIAGNOSIS — H54.7 POOR VISION: ICD-10-CM

## 2025-05-08 DIAGNOSIS — Z13.29 SCREENING FOR HYPOTHYROIDISM: ICD-10-CM

## 2025-05-08 DIAGNOSIS — M79.89 LEG SWELLING: ICD-10-CM

## 2025-05-08 DIAGNOSIS — R73.03 PREDIABETES: ICD-10-CM

## 2025-05-08 DIAGNOSIS — I10 PRIMARY HYPERTENSION: ICD-10-CM

## 2025-05-08 DIAGNOSIS — H00.14 CHALAZION OF LEFT UPPER EYELID: Primary | ICD-10-CM

## 2025-05-08 DIAGNOSIS — E78.5 HYPERLIPIDEMIA, UNSPECIFIED HYPERLIPIDEMIA TYPE: ICD-10-CM

## 2025-05-08 LAB — HBA1C MFR BLD: 5.8 %

## 2025-05-08 RX ORDER — AMLODIPINE BESYLATE 10 MG/1
10 TABLET ORAL DAILY
Qty: 90 TABLET | Refills: 1 | Status: SHIPPED | OUTPATIENT
Start: 2025-05-08 | End: 2025-11-04

## 2025-05-08 RX ORDER — FUROSEMIDE 20 MG/1
20 TABLET ORAL DAILY
Qty: 7 TABLET | Refills: 0 | Status: SHIPPED | OUTPATIENT
Start: 2025-05-08 | End: 2025-05-15

## 2025-05-08 RX ORDER — ATORVASTATIN CALCIUM 40 MG/1
40 TABLET, FILM COATED ORAL DAILY
Qty: 90 TABLET | Refills: 1 | Status: SHIPPED | OUTPATIENT
Start: 2025-05-08 | End: 2025-11-04

## 2025-05-08 RX ORDER — NYSTATIN 100000 U/G
CREAM TOPICAL
Qty: 3 EACH | Refills: 1 | Status: SHIPPED | OUTPATIENT
Start: 2025-05-08

## 2025-05-08 RX ORDER — BUPROPION HYDROCHLORIDE 100 MG/1
100 TABLET ORAL 3 TIMES DAILY
Qty: 270 TABLET | Refills: 0 | Status: SHIPPED | OUTPATIENT
Start: 2025-05-08

## 2025-05-08 ASSESSMENT — ENCOUNTER SYMPTOMS
SHORTNESS OF BREATH: 0
CHEST TIGHTNESS: 0
WHEEZING: 0
NAUSEA: 0
COUGH: 0
CONSTIPATION: 0
ABDOMINAL PAIN: 0

## 2025-05-08 ASSESSMENT — PATIENT HEALTH QUESTIONNAIRE - PHQ9
SUM OF ALL RESPONSES TO PHQ QUESTIONS 1-9: 0
1. LITTLE INTEREST OR PLEASURE IN DOING THINGS: NOT AT ALL
SUM OF ALL RESPONSES TO PHQ QUESTIONS 1-9: 0
2. FEELING DOWN, DEPRESSED OR HOPELESS: NOT AT ALL

## 2025-05-08 NOTE — PROGRESS NOTES
Chief Complaint   Patient presents with    Foot Pain     Pain and swelling in both feet    Stye     Stye in left eyelid X 8 months - vision check 20/50 both eyes

## 2025-05-08 NOTE — PROGRESS NOTES
Avis Sánchez (:  1975) is a 49 y.o. female who presents to the office today for the following:  Foot Pain (Pain and swelling in both feet) and Stye (Stye in left eyelid X 8 months - vision check 20/50 both eyes)      Assessment & Plan   ASSESSMENT/PLAN:  1. Chalazion of left upper eyelid  -     External Referral To Ophthalmology  2. Primary hypertension  -     Urinalysis; Future  -     Comprehensive Metabolic Panel; Future  -     CBC with Auto Differential; Future  3. Hyperlipidemia, unspecified hyperlipidemia type  -     Lipid Panel; Future  4. BMI 45.0-49.9, adult (HCC)  5. Leg swelling  -     Brain Natriuretic Peptide; Future  -     furosemide (LASIX) 20 MG tablet; Take 1 tablet by mouth daily for 7 days, Disp-7 tablet, R-0Normal  6. Poor vision  -     External Referral To Ophthalmology  7. Screening for hypothyroidism  -     TSH; Future  8. Prediabetes  -     AMB POC HEMOGLOBIN A1C      Results for orders placed or performed in visit on 25   AMB POC HEMOGLOBIN A1C   Result Value Ref Range    Hemoglobin A1C, POC 5.8 %        Return in about 4 weeks (around 2025) for review lab results.         Subjective   SUBJECTIVE/OBJECTIVE:  Foot Pain   Pertinent negatives include no fever or numbness.     Pt presents to the clinic for stye to upper left eye lid for about a week. Pt denies pain drainage or injury. Pt has poor vision but denies any worsening vision or irritation. Referral given to Dr Nava. Pt also has noticed bilateral leg swelling that goes away overnight. Pt denies sob palpitations HA dizziness chest pain. Routine labs ordered and pt to return in 1 month to discuss results of labs. Have not seen pt in about a year.    Allergies   Allergen Reactions    Cefazolin Hives    Penicillins Swelling     Current Outpatient Medications   Medication Sig Dispense Refill    furosemide (LASIX) 20 MG tablet Take 1 tablet by mouth daily for 7 days 7 tablet 0    atorvastatin (LIPITOR) 40 MG tablet

## 2025-05-08 NOTE — PATIENT INSTRUCTIONS
It was a pleasure to see you today.    1. Chalazion of left upper eyelid    2. Primary hypertension    3. Hyperlipidemia, unspecified hyperlipidemia type    4. BMI 45.0-49.9, adult (HCC)    5. Leg swelling    6. Poor vision    7. Screening for hypothyroidism    8. Prediabetes         No follow-ups on file.     Thank you for choosing [facility].    Luz García, APRN - NP Patient Education        Eating Healthy Foods: Care Instructions  With every meal, you can make healthy food choices. Try to eat a variety of fruits, vegetables, whole grains, lean proteins, and low-fat dairy products. This can help you get the right balance of nutrients, including vitamins and minerals. Small changes add up over time. You can start by adding one healthy food to your meals each day.    Try to make half your plate fruits and vegetables, one-fourth whole grains, and one-fourth lean proteins. Try including dairy with your meals.   Eat more fruits and vegetables. Try to have them with most meals and snacks.   Foods for healthy eating        Fruits   These can be fresh, frozen, canned, or dried.  Try to choose whole fruit rather than fruit juice.  Eat a variety of colors.        Vegetables   These can be fresh, frozen, canned, or dried.  Beans, peas, and lentils count too.        Whole grains   Choose whole-grain breads, cereals, and noodles.  Try brown rice.        Lean proteins   These can include lean meat, poultry, fish, and eggs.  You can also have tofu, beans, peas, lentils, nuts, and seeds.        Dairy   Try milk, yogurt, and cheese.  Choose low-fat or fat-free when you can.  If you need to, use lactose-free milk or fortified plant-based milk products, such as soy milk.        Water   Drink water when you're thirsty.  Limit sugar-sweetened drinks, including soda, fruit drinks, and sports drinks.  Where can you learn more?  Go to https://www.healthwise.net/patientEd and enter T703 to learn more

## 2025-05-23 ENCOUNTER — RESULTS FOLLOW-UP (OUTPATIENT)
Facility: CLINIC | Age: 50
End: 2025-05-23

## 2025-05-23 LAB
ALBUMIN SERPL-MCNC: 4.1 G/DL (ref 3.9–4.9)
ALP SERPL-CCNC: 132 IU/L (ref 44–121)
ALT SERPL-CCNC: 15 IU/L (ref 0–32)
APPEARANCE UR: ABNORMAL
AST SERPL-CCNC: 20 IU/L (ref 0–40)
BACTERIA #/AREA URNS HPF: ABNORMAL /[HPF]
BASOPHILS # BLD AUTO: 0.1 X10E3/UL (ref 0–0.2)
BASOPHILS NFR BLD AUTO: 1 %
BILIRUB SERPL-MCNC: 0.3 MG/DL (ref 0–1.2)
BILIRUB UR QL STRIP: NEGATIVE
BNP SERPL-MCNC: 54.9 PG/ML (ref 0–100)
BUN SERPL-MCNC: 10 MG/DL (ref 6–24)
BUN/CREAT SERPL: 12 (ref 9–23)
CALCIUM SERPL-MCNC: 9.6 MG/DL (ref 8.7–10.2)
CASTS URNS QL MICRO: ABNORMAL /LPF
CHLORIDE SERPL-SCNC: 105 MMOL/L (ref 96–106)
CHOLEST SERPL-MCNC: 139 MG/DL (ref 100–199)
CO2 SERPL-SCNC: 24 MMOL/L (ref 20–29)
COLOR UR: YELLOW
CREAT SERPL-MCNC: 0.81 MG/DL (ref 0.57–1)
CRYSTALS URNS MICRO: ABNORMAL
EGFRCR SERPLBLD CKD-EPI 2021: 88 ML/MIN/1.73
EOSINOPHIL # BLD AUTO: 0.2 X10E3/UL (ref 0–0.4)
EOSINOPHIL NFR BLD AUTO: 2 %
EPI CELLS #/AREA URNS HPF: >10 /HPF (ref 0–10)
ERYTHROCYTE [DISTWIDTH] IN BLOOD BY AUTOMATED COUNT: 12.5 % (ref 11.7–15.4)
GLOBULIN SER CALC-MCNC: 3.4 G/DL (ref 1.5–4.5)
GLUCOSE SERPL-MCNC: 104 MG/DL (ref 70–99)
GLUCOSE UR QL STRIP: NEGATIVE
HCT VFR BLD AUTO: 35.1 % (ref 34–46.6)
HDLC SERPL-MCNC: 29 MG/DL
HGB BLD-MCNC: 11.5 G/DL (ref 11.1–15.9)
HGB UR QL STRIP: NEGATIVE
IMM GRANULOCYTES # BLD AUTO: 0 X10E3/UL (ref 0–0.1)
IMM GRANULOCYTES NFR BLD AUTO: 0 %
IMP & REVIEW OF LAB RESULTS: NORMAL
KETONES UR QL STRIP: ABNORMAL
LDLC SERPL CALC-MCNC: 91 MG/DL (ref 0–99)
LEUKOCYTE ESTERASE UR QL STRIP: ABNORMAL
LYMPHOCYTES # BLD AUTO: 2.9 X10E3/UL (ref 0.7–3.1)
LYMPHOCYTES NFR BLD AUTO: 31 %
MCH RBC QN AUTO: 32.3 PG (ref 26.6–33)
MCHC RBC AUTO-ENTMCNC: 32.8 G/DL (ref 31.5–35.7)
MCV RBC AUTO: 99 FL (ref 79–97)
MICRO URNS: ABNORMAL
MONOCYTES # BLD AUTO: 1.1 X10E3/UL (ref 0.1–0.9)
MONOCYTES NFR BLD AUTO: 11 %
NEUTROPHILS # BLD AUTO: 5.3 X10E3/UL (ref 1.4–7)
NEUTROPHILS NFR BLD AUTO: 55 %
NITRITE UR QL STRIP: NEGATIVE
PH UR STRIP: 6 [PH] (ref 5–7.5)
PLATELET # BLD AUTO: 323 X10E3/UL (ref 150–450)
POTASSIUM SERPL-SCNC: 3.8 MMOL/L (ref 3.5–5.2)
PROT SERPL-MCNC: 7.5 G/DL (ref 6–8.5)
PROT UR QL STRIP: ABNORMAL
RBC # BLD AUTO: 3.56 X10E6/UL (ref 3.77–5.28)
RBC #/AREA URNS HPF: ABNORMAL /HPF (ref 0–2)
SODIUM SERPL-SCNC: 143 MMOL/L (ref 134–144)
SP GR UR STRIP: 1.02 (ref 1–1.03)
TRIGL SERPL-MCNC: 103 MG/DL (ref 0–149)
TSH SERPL DL<=0.005 MIU/L-ACNC: 1.68 UIU/ML (ref 0.45–4.5)
UNIDENT CRYS URNS QL MICRO: PRESENT
UROBILINOGEN UR STRIP-MCNC: 1 MG/DL (ref 0.2–1)
VLDLC SERPL CALC-MCNC: 19 MG/DL (ref 5–40)
WBC # BLD AUTO: 9.5 X10E3/UL (ref 3.4–10.8)
WBC #/AREA URNS HPF: ABNORMAL /HPF (ref 0–5)

## 2025-06-19 RX ORDER — CETIRIZINE HYDROCHLORIDE 10 MG/1
10 TABLET ORAL DAILY
Qty: 90 TABLET | Refills: 0 | Status: SHIPPED | OUTPATIENT
Start: 2025-06-19

## 2025-07-10 ENCOUNTER — OFFICE VISIT (OUTPATIENT)
Facility: CLINIC | Age: 50
End: 2025-07-10
Payer: MEDICAID

## 2025-07-10 VITALS
BODY MASS INDEX: 46.56 KG/M2 | HEIGHT: 62 IN | SYSTOLIC BLOOD PRESSURE: 130 MMHG | TEMPERATURE: 97.5 F | DIASTOLIC BLOOD PRESSURE: 74 MMHG | RESPIRATION RATE: 16 BRPM | WEIGHT: 253 LBS | HEART RATE: 54 BPM | OXYGEN SATURATION: 99 %

## 2025-07-10 DIAGNOSIS — I10 PRIMARY HYPERTENSION: Primary | ICD-10-CM

## 2025-07-10 DIAGNOSIS — M79.89 LEG SWELLING: ICD-10-CM

## 2025-07-10 LAB
BILIRUBIN, URINE, POC: ABNORMAL
BLOOD URINE, POC: NEGATIVE
GLUCOSE URINE, POC: NEGATIVE
KETONES, URINE, POC: ABNORMAL
LEUKOCYTE ESTERASE, URINE, POC: NEGATIVE
NITRITE, URINE, POC: NEGATIVE
PH, URINE, POC: 6 (ref 4.6–8)
PROTEIN,URINE, POC: 30
SPECIFIC GRAVITY, URINE, POC: 1.02 (ref 1–1.03)
URINALYSIS CLARITY, POC: ABNORMAL
URINALYSIS COLOR, POC: YELLOW
UROBILINOGEN, POC: ABNORMAL MG/DL

## 2025-07-10 PROCEDURE — 99213 OFFICE O/P EST LOW 20 MIN: CPT | Performed by: NURSE PRACTITIONER

## 2025-07-10 PROCEDURE — 81001 URINALYSIS AUTO W/SCOPE: CPT | Performed by: NURSE PRACTITIONER

## 2025-07-10 PROCEDURE — 3075F SYST BP GE 130 - 139MM HG: CPT | Performed by: NURSE PRACTITIONER

## 2025-07-10 PROCEDURE — 3078F DIAST BP <80 MM HG: CPT | Performed by: NURSE PRACTITIONER

## 2025-07-10 RX ORDER — CHLORTHALIDONE 25 MG/1
25 TABLET ORAL DAILY
Qty: 30 TABLET | Refills: 3 | Status: SHIPPED | OUTPATIENT
Start: 2025-07-10

## 2025-07-10 RX ORDER — LISINOPRIL 20 MG/1
20 TABLET ORAL DAILY
Qty: 90 TABLET | Refills: 1 | Status: SHIPPED | OUTPATIENT
Start: 2025-07-10

## 2025-07-10 SDOH — ECONOMIC STABILITY: FOOD INSECURITY: WITHIN THE PAST 12 MONTHS, THE FOOD YOU BOUGHT JUST DIDN'T LAST AND YOU DIDN'T HAVE MONEY TO GET MORE.: NEVER TRUE

## 2025-07-10 SDOH — ECONOMIC STABILITY: FOOD INSECURITY: WITHIN THE PAST 12 MONTHS, YOU WORRIED THAT YOUR FOOD WOULD RUN OUT BEFORE YOU GOT MONEY TO BUY MORE.: NEVER TRUE

## 2025-07-10 ASSESSMENT — ENCOUNTER SYMPTOMS
WHEEZING: 0
COUGH: 0
SHORTNESS OF BREATH: 0
NAUSEA: 0
CONSTIPATION: 0
CHEST TIGHTNESS: 0
DIARRHEA: 0
ABDOMINAL PAIN: 0

## 2025-07-10 NOTE — PATIENT INSTRUCTIONS
It was a pleasure to see you today.    1. Primary hypertension    2. Leg swelling         Return in about 3 weeks (around 7/31/2025).     Thank you for choosing [facility].    OLIVIA Lala NP

## 2025-07-10 NOTE — PROGRESS NOTES
Chief Complaint   Patient presents with    Foot Swelling     Swelling of both ankles and feet - painful     Spasms     Muscle cramps ( charley horse) pains in legs

## 2025-07-10 NOTE — PROGRESS NOTES
Avis Sánchez (:  1975) is a 49 y.o. female who presents to the office today for the following:  Foot Swelling (Swelling of both ankles and feet - painful ) and Spasms (Muscle cramps ( charley horse) pains in legs)      Assessment & Plan   ASSESSMENT/PLAN:  1. Primary hypertension  -     Basic Metabolic Panel; Future  -     AMB POC URINALYSIS DIP STICK AUTO W/ MICRO  -     Hepatic Function Panel; Future  -     CBC with Auto Differential; Future  2. Leg swelling  -     Basic Metabolic Panel; Future  -     AMB POC URINALYSIS DIP STICK AUTO W/ MICRO  -     Hepatic Function Panel; Future  -     CBC with Auto Differential; Future      Results for orders placed or performed in visit on 07/10/25   AMB POC URINALYSIS DIP STICK AUTO W/ MICRO   Result Value Ref Range    Color (UA POC) Yellow     Clarity (UA POC) Cloudy     Glucose, Urine, POC Negative     Bilirubin, Urine, POC Small     Ketones, Urine, POC Trace     Specific Gravity, Urine, POC 1.025 1.001 - 1.035    Blood (UA POC) Negative     pH, Urine, POC 6.0 4.6 - 8.0    Protein, Urine, POC 30     Urobilinogen, POC 2 mg/dL <1.1 mg/dL    Nitrite, Urine, POC Negative Negative    Leukocyte Esterase, Urine, POC Negative         Return in about 3 weeks (around 2025), or if symptoms worsen or fail to improve.         Subjective   SUBJECTIVE/OBJECTIVE:  Patient presents to the clinic today for with the chief complaint of swelling to the lower legs.  Patient states this has occurred for about a month.  Patient denies any chest pain shortness of breath.  Oxygen saturation is in upper 90s today.  Legs are noticeably swollen.  Patient currently on Norvasc.  Patient states had a reaction to hydrochlorothiazide.  Will switch medications to lisinopril and Hygroton.  CBC CMP will be drawn.  UA POC was positive for protein urobilinogen bilirubin and ketones.  Negative for blood nitrites or leukocyte esterase.  Of note this past May, patient had a similar episode.

## 2025-07-23 RX ORDER — METOPROLOL TARTRATE 25 MG/1
TABLET, FILM COATED ORAL
Qty: 180 TABLET | Refills: 1 | Status: SHIPPED | OUTPATIENT
Start: 2025-07-23

## 2025-09-02 RX ORDER — BUPROPION HYDROCHLORIDE 100 MG/1
100 TABLET ORAL 3 TIMES DAILY
Qty: 270 TABLET | Refills: 0 | Status: SHIPPED | OUTPATIENT
Start: 2025-09-02